# Patient Record
Sex: FEMALE | Race: WHITE | NOT HISPANIC OR LATINO | Employment: FULL TIME | URBAN - METROPOLITAN AREA
[De-identification: names, ages, dates, MRNs, and addresses within clinical notes are randomized per-mention and may not be internally consistent; named-entity substitution may affect disease eponyms.]

---

## 2017-01-23 ENCOUNTER — GENERIC CONVERSION - ENCOUNTER (OUTPATIENT)
Dept: OTHER | Facility: OTHER | Age: 47
End: 2017-01-23

## 2017-03-20 ENCOUNTER — GENERIC CONVERSION - ENCOUNTER (OUTPATIENT)
Dept: OTHER | Facility: OTHER | Age: 47
End: 2017-03-20

## 2017-04-07 ENCOUNTER — ALLSCRIPTS OFFICE VISIT (OUTPATIENT)
Dept: OTHER | Facility: OTHER | Age: 47
End: 2017-04-07

## 2017-07-14 ENCOUNTER — HOSPITAL ENCOUNTER (OUTPATIENT)
Dept: RADIOLOGY | Facility: HOSPITAL | Age: 47
Discharge: HOME | End: 2017-07-14
Payer: COMMERCIAL

## 2017-07-14 DIAGNOSIS — Z12.31 ENCOUNTER FOR SCREENING MAMMOGRAM FOR MALIGNANT NEOPLASM OF BREAST: ICD-10-CM

## 2017-07-14 PROCEDURE — G0202 SCR MAMMO BI INCL CAD: HCPCS

## 2017-07-22 ENCOUNTER — APPOINTMENT (EMERGENCY)
Dept: RADIOLOGY | Facility: HOSPITAL | Age: 47
End: 2017-07-22
Payer: COMMERCIAL

## 2017-07-22 ENCOUNTER — HOSPITAL ENCOUNTER (EMERGENCY)
Facility: HOSPITAL | Age: 47
Discharge: HOME | End: 2017-07-22
Attending: EMERGENCY MEDICINE | Admitting: EMERGENCY MEDICINE
Payer: COMMERCIAL

## 2017-07-22 VITALS
TEMPERATURE: 98.5 F | BODY MASS INDEX: 21.59 KG/M2 | HEART RATE: 94 BPM | SYSTOLIC BLOOD PRESSURE: 117 MMHG | OXYGEN SATURATION: 95 % | WEIGHT: 137.57 LBS | HEIGHT: 67 IN | DIASTOLIC BLOOD PRESSURE: 70 MMHG | RESPIRATION RATE: 18 BRPM

## 2017-07-22 DIAGNOSIS — R50.9 FEVER: Primary | ICD-10-CM

## 2017-07-22 DIAGNOSIS — R05.9 COUGH: ICD-10-CM

## 2017-07-22 DIAGNOSIS — R11.0 NAUSEA: ICD-10-CM

## 2017-07-22 LAB
BACTERIA UR QL AUTO: ABNORMAL /HPF
BILIRUB UR QL STRIP: NEGATIVE
CLARITY UR: CLEAR
CLARITY, POC: CLEAR
COLOR UR: YELLOW
COLOR, POC: YELLOW
EXT BILIRUBIN, UA: NEGATIVE
EXT BLOOD URINE: ABNORMAL
EXT GLUCOSE, UA: NEGATIVE
EXT KETONES: ABNORMAL
EXT NITRITE, UA: NEGATIVE
EXT PH, UA: 6
EXT PROTEIN, UA: ABNORMAL
EXT SPECIFIC GRAVITY, UA: 1
EXT UROBILINOGEN: NEGATIVE
GLUCOSE UR STRIP-MCNC: NEGATIVE MG/DL
HCG UR QL: NEGATIVE
HGB UR QL STRIP.AUTO: ABNORMAL
KETONES UR STRIP-MCNC: ABNORMAL MG/DL
LEUKOCYTE ESTERASE UR QL STRIP: ABNORMAL
NITRITE UR QL STRIP: NEGATIVE
NON-SQ EPI CELLS URNS QL MICRO: ABNORMAL /HPF
PH UR STRIP.AUTO: 6 [PH] (ref 4.5–8)
PROT UR STRIP-MCNC: NEGATIVE MG/DL
RBC #/AREA URNS AUTO: ABNORMAL /HPF
SP GR UR STRIP.AUTO: <=1.005 (ref 1–1.03)
UROBILINOGEN UR QL STRIP.AUTO: 0.2 E.U./DL
WBC # BLD EST: ABNORMAL 10*3/UL
WBC #/AREA URNS AUTO: ABNORMAL /HPF

## 2017-07-22 PROCEDURE — 81002 URINALYSIS NONAUTO W/O SCOPE: CPT | Performed by: EMERGENCY MEDICINE

## 2017-07-22 PROCEDURE — 99284 EMERGENCY DEPT VISIT MOD MDM: CPT

## 2017-07-22 PROCEDURE — 71020 HB CHEST X-RAY 2VW FRONTAL&LATL: CPT

## 2017-07-22 PROCEDURE — 81001 URINALYSIS AUTO W/SCOPE: CPT | Performed by: EMERGENCY MEDICINE

## 2017-07-22 PROCEDURE — 81025 URINE PREGNANCY TEST: CPT | Performed by: EMERGENCY MEDICINE

## 2017-07-22 RX ORDER — ONDANSETRON 4 MG/1
8 TABLET, ORALLY DISINTEGRATING ORAL ONCE
Status: COMPLETED | OUTPATIENT
Start: 2017-07-22 | End: 2017-07-22

## 2017-07-22 RX ORDER — CEPHALEXIN 500 MG/1
500 CAPSULE ORAL EVERY 6 HOURS SCHEDULED
COMMUNITY
End: 2018-05-18 | Stop reason: HOSPADM

## 2017-07-22 RX ORDER — ONDANSETRON 4 MG/1
8 TABLET, ORALLY DISINTEGRATING ORAL EVERY 8 HOURS PRN
Qty: 10 TABLET | Refills: 0 | Status: SHIPPED | OUTPATIENT
Start: 2017-07-22 | End: 2018-05-18 | Stop reason: HOSPADM

## 2017-07-22 RX ORDER — NAPROXEN 250 MG/1
500 TABLET ORAL ONCE
Status: COMPLETED | OUTPATIENT
Start: 2017-07-22 | End: 2017-07-22

## 2017-07-22 RX ADMIN — ONDANSETRON 8 MG: 4 TABLET, ORALLY DISINTEGRATING ORAL at 15:42

## 2017-07-22 RX ADMIN — NAPROXEN 500 MG: 250 TABLET ORAL at 15:42

## 2018-01-12 VITALS
RESPIRATION RATE: 16 BRPM | WEIGHT: 144.38 LBS | DIASTOLIC BLOOD PRESSURE: 76 MMHG | HEIGHT: 67 IN | HEART RATE: 68 BPM | SYSTOLIC BLOOD PRESSURE: 114 MMHG | BODY MASS INDEX: 22.66 KG/M2

## 2018-01-12 NOTE — PROGRESS NOTES
Assessment    1  Symptomatic varicose veins of left lower extremity (454 8) (Y32 020)    Plan     1  Gradient compression stocking, below knee, 20-30mm Hg, each; Status:Complete;     Done: 51BFA9676   2  Apply moisturizing cream or lotion several times a day ; Status:Complete;   Done:   77NXJ9812   3  Keep your leg elevated whenever you can to decrease swelling and increase circulation ;   Status:Complete;   Done: 83HXJ0265   4  Support stockings can help keep the blood from pooling in the small veins in your feet   and legs ; Status:Complete;   Done: 21OGR9245    Follow-up visit in 3 months Evaluation and Treatment  Follow-up  Status: Hold For - Scheduling  Requested for: 14Pup6745  Ordered; For: Symptomatic varicose veins of left lower extremity;  Ordered By: Dale Bonilla  Performed:   Due: 21WWV1723     Discussion/Summary  Discussion Summary:   Left medial calf varicosity with mild discomfort  Recommended conservative measures which includes compression (Rx given), periodic leg elevations, exercise and maintaining a normal weight  Stockings to be worn daily and removed at night  She will follow up with me in 3 months to check her symptoms with the use of compression  Chief Complaint  Chief Complaint Free Text Note Form: Patient is new to our practice and was referred by Dr Crystal Noyola (PCP)  She has not had any testing done  She complains of a bulging painful vein in her left leg by her knee for the past 10 years  She does not wear compression stockings or elevate her legs  History of Present Illness  Varicose Veins [facility]: The patient is being seen for an initial evaluation of an existing diagnosis of varicose veins  Referred by: Dr Kayy Cohen  Symptoms:  left dilated veins, bilateral leg swelling, bilateral muscle cramps, left spider veins and left leg pain, but no stasis dermatitis, no cellulitis, no hyperpigmentation, no venous ulcers, no dry skin, no itching and no bleeding     The patient describes the pain as aching and dull  These symptoms developed 10 year(s) ago  This patient has no history of DVT, pulmonary embolism, superficial venous thrombosis, or a hypercoagulable state  Evaluation and Treatment History: She was previously evaluated by a primary physician  This patient has had no prior surgical treatment of the venous system  The patient has never used compression hose  Free Text HPI: 53-year-old female comes in for initial evaluation of her veins  She has a bulging vein of the left medial calf is present for more than 10 years and though she should seek treatment  She has some aching discomfort to the area and tenderness when touched  She's not limited her daily activities  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  She reports ankle swelling at the end of the day  She is not currently wearing compression  She denies any history of deep or superficial venous thrombosis  No stasis changes  No history of ulcers or bleeding veins  Review of Systems  Complete Female - Vasc:   Constitutional: No fever or chills, feels well, no tiredness, no recent weight gain or weight loss  Eyes: No sudden vision loss, no blurred vision, no double vision  ENT: no loss of hearing, no nosebleeds, no hoarseness  Cardiovascular: no leg pain with walking and no bleeding veins, but regular heart rate, no chest pain, no intermittent leg claudication, painful veins and no palpitations  Respiratory: No sob, no wheezing, no cough, no sob with exertion, no orthopnea  Gastrointestinal: No nausea, No vomiting, no diarrhea, no blood in stool  Genitourinary: no dysuria, no Hematuria,no urinary incontinence  Musculoskeletal: no limb pain, no limb swelling  Integumentary: dry skin and no ulcers, but no rashes, no itching, no skin lesions and no skin wound  Neurological: no dementia, no headache, no numbness, no limb weakness, no dizziness, no difficulty walking  Psychiatric: anxiety and no mood disorder, but no depression  Hematologic/Lymphatic: no bleeding disorder, no easy bruising  ROS Reviewed:   ROS reviewed  Active Problems    1  Bloating (787 3) (R14 0)   2  Dysmenorrhea (625 3) (N94 6)   3  Encounter for gynecological examination without abnormal finding (V72 31) (Z01 419)   4  Encounter for routine gynecological examination (V72 31) (Z01 419)   5  Encounter for screening mammogram for malignant neoplasm of breast (V76 12)   (Z12 31)   6  Encounter for surveillance of contraceptive pills (V25 41) (Z30 41)    Past Medical History    1  History of Atypical squamous cells cannot exclude high grade squamous intraepithelial   lesion on cytologic smear of cervix (ASC-H) (795 02) (R87 611)   2  History of BETTY III (cervical intraepithelial neoplasia grade III) with severe dysplasia   (233 1) (D06 9)   3  History of Colposcopy Cervix With Biopsy(S)   4  History of human papillomavirus infection (V12 09) (Z86 19)   5  History of uterine leiomyoma (V13 29) (Z86 018)   6  History of Mild cervical dysplasia (622 11) (N87 0)   7  History of Pap Smear (+) Low Grade Squamous Intraepithelial Lesion (795 03)   8  History of Previous Spontaneous Vaginal Delivery  Active Problems And Past Medical History Reviewed: The active problems and past medical history were reviewed and updated today  Surgical History  Surgical History Reviewed: The surgical history was reviewed and updated today  Family History  Mother    1  No pertinent family history  Father    2  No pertinent family history  Family History    3  Family history of Arthritis (V17 7)   4  Family history of Diabetes Mellitus (V18 0)   5  Family history of Epilepsy   6  Family history of Urinary Incontinence   7  Family history of Varicose Veins Of Lower Extremities  Family History Reviewed: The family history was reviewed and updated today         Social History    · Being A Social Drinker   · Birth Control Method - Oral Contraceptives   · Caffeine Use   · Never A Smoker  Social History Reviewed: The social history was reviewed and updated today  Current Meds   1  Beyaz 3-0 02-0 451 MG Oral Tablet; Take 1 tablet daily; Therapy: 99NBC9123 to (Evaluate:09Phm3982)  Requested for: 21Nov2016; Last   Rx:21Nov2016 Ordered   2  Cyclafem 7/7/7 0 5/0 75/1-35 MG-MCG Oral Tablet; Take 1 tablet daily as directed; Therapy: 04NEV9589 to (Last Rx:12Oct2015)  Requested for: 19Oct2016; Status: ACTIVE   - Renewal Denied Ordered   3  Xanax 0 25 MG Oral Tablet; Therapy: (Recorded:30Sep2013) to Recorded  Medication List Reviewed: The medication list was reviewed and updated today  Allergies    1  Sulfa Drugs    Vitals  Vital Signs    Recorded: 84Pnc5431 04:59PM   Heart Rate 64, L Radial   Pulse Quality Normal, L Radial   Respiration Quality Normal   Respiration 14   Systolic 027, LUE, Sitting   Diastolic 70, LUE, Sitting   Height 5 ft 7 in   Weight 143 lb    BMI Calculated 22 4   BSA Calculated 1 75     Physical Exam    Posterior tibialis: right 2+ and left 2+  Dorsalis pedis: right 2+ and left 2+  Distal Pulse Exam: Normal Capillary Refill  Extremities: bilateral pretibial trace+ pitting edema  LE Varicose Veins: no right leg truncal veins, left leg reticular veins, no right leg spider veins and left leg spider veins  The heart rate was normal  The rhythm was regular  Heart sounds: normal S1 and normal S2    Murmurs: No murmurs were heard  Pulmonary   Respiratory effort: No increased work of breathing or signs of respiratory distress  Auscultation of lungs: Clear to auscultation  No wheezing, no rales, no rhonchi  Abdomen   Abdomen: Abdomen soft, non-tender, no masses, non distended, no rebound tenderness  Psychiatric   Orientation to person, place and time: Normal    Mood and affect: Normal    Neurologic Sensory exam normal   Motor skills intact     Musculoskeletal   Gait and station: Normal  Skin   Skin and subcutaneous tissue: Normal without rashes or lesions  Palpation of skin and subcutaneous tissue: Normal turgor  Venous Disease: No lipodermatosclerosis, stasis dermatitis, hyperpigmentation, or atrophie roshan noted on exam       Future Appointments    Date/Time Provider Specialty Site   03/13/2017 04:00 PM YOSHI Noland Vascular Surgery [facility]   10/27/2017 01:00 PM Salvador Calero MD Obstetrics/Gynecology [facility]     Signatures   Electronically signed by : Mya Farnsworth; Dec 21 2016  5:35PM EST                       (Author)    Electronically signed by :  Carri Chavez MD; Dec 27 2016  1:50PM EST                       (Author)

## 2018-01-12 NOTE — MISCELLANEOUS
Message   Recorded as Task   Date: 01/23/2017 08:06 AM, Created By: Dann Feliciano   Task Name: Med Renewal Request   Assigned To: Noemi Kendall   Regarding Patient: Adrianna Mcdaniel, Status: In Progress   Comment:    Lizette Stein - 23 Jan 2017 8:06 AM     TASK CREATED  JUST GOT A 90 DAY SUPPLY OF BEYAZ DOESNT LIKE THIS ONE AND WANTS TO GO BACK TO THE OLD PILL AND SHE WILL PAY OUT OF POCKET FOR IT CYCLOFEM  SHE WANTS THIS TO GO TO [facility] IN [place]  WANTS 90 DAY SUPPLY  Fay Grossman - 23 Jan 2017 9:11 AM     TASK EDITED  ok to send in old rx to pharm? Scot Cipro - 23 Jan 2017 9:19 AM     TASK IN PROGRESS   Scot Cipro - 23 Jan 2017 9:23 AM     TASK EDITED  Pt wants to go back on cyclafem  Said she thought it made her bloated  - Elenor Skyler gives her cramps  and is hot all the time  She is willing to pay for 3 mos of cyclafem at [facility] and then we are to change mail in to cyclafem for 3 mo  hence!!! Ok by you? ? Alannah Coca - 23 Jan 2017 12:48 PM     TASK REPLIED TO: Previously Assigned To Alannah Coclui                      yes please send in   Scot Cipro - 23 Jan 2017 1:13 PM     TASK EDITED  [facility]  She is to call us back in 2 mos for us to change rx for [facility]) - 3 mos of cyclafem  She will call us! ! Active Problems    1  Bloating (787 3) (R14 0)   2  Dysmenorrhea (625 3) (N94 6)   3  Encounter for gynecological examination without abnormal finding (V72 31) (Z01 419)   4  Encounter for routine gynecological examination (V72 31) (Z01 419)   5  Encounter for screening mammogram for malignant neoplasm of breast (V76 12)   (Z12 31)   6  Encounter for surveillance of contraceptive pills (V25 41) (Z30 41)   7  Symptomatic varicose veins of left lower extremity (454 8) (K21 684)    Current Meds   1  Beyaz 3-0 02-0 451 MG Oral Tablet (Drospiren-Eth Estrad-Levomefol); Take 1 tablet   daily;    Therapy: 76FKM6389 to (Evaluate:41Usw5116)  Requested for: 13EWD8864; Last   Rx:21Nov2016 Ordered   2  Cyclafem 7/7/7 0 5/0 75/1-35 MG-MCG Oral Tablet; Take 1 tablet daily as directed; Therapy: 97YBW2231 to (Last Rx:17Jan2017)  Requested for: 23SGK8389 Ordered   3  Xanax 0 25 MG Oral Tablet (ALPRAZolam); Therapy: (Recorded:30Sep2013) to Recorded    Allergies    1  Sulfa Drugs    Plan  Dysmenorrhea, Encounter for surveillance of contraceptive pills    · From  Cyclafem 7/7/7 0 5/0 75/1-35 MG-MCG Oral Tablet Take 1 tablet daily as  directed To Cyclafem 7/7/7 0 5/0 75/1-35 MG-MCG Oral Tablet TAKE 1 TABLET DAILY    Signatures   Electronically signed by :  Javan Braswell, ; Jan 23 2017  1:13PM EST                       (Author)

## 2018-01-12 NOTE — MISCELLANEOUS
Message   Recorded as Task   Date: 03/20/2017 11:43 AM, Created By: Henry Mg   Task Name: Med Renewal Request   Assigned To: Amanda Jimenez   Regarding Patient: Fuad Oconnell, Status: Active   Comment:    Jaja Gallardo - 20 Mar 2017 11:43 AM     TASK CREATED  Caller: Self; Renew Medication; [phone] (Home); [phone] (Work)  pt is calling in that she likes the current pill Cyclaremm 777-she is on- would like it sent to [facility]-3 month supply  yearly October 2017  she is @[phone]  Amanda Jimenez - 20 Mar 2017 11:54 AM     TASK EDITED  rx to pharm        Active Problems    1  Bloating (787 3) (R14 0)   2  Dysmenorrhea (625 3) (N94 6)   3  Encounter for gynecological examination without abnormal finding (V72 31) (Z01 419)   4  Encounter for routine gynecological examination (V72 31) (Z01 419)   5  Encounter for screening mammogram for malignant neoplasm of breast (V76 12)   (Z12 31)   6  Encounter for surveillance of contraceptive pills (V25 41) (Z30 41)   7  Symptomatic varicose veins of left lower extremity (454 8) (O60 740)    Current Meds   1  Beyaz 3-0 02-0 451 MG Oral Tablet (Drospiren-Eth Estrad-Levomefol); Take 1 tablet   daily; Therapy: 27HZB9559 to (Evaluate:80Yrj7041)  Requested for: 21Nov2016; Last   Rx:21Nov2016 Ordered   2  Cyclafem 7/7/7 0 5/0 75/1-35 MG-MCG Oral Tablet; TAKE 1 TABLET DAILY; Therapy: 54LWE2697 to (Evaluate:23Apr2017)  Requested for: 89OQH3705; Last   Rx:23Jan2017 Ordered   3  Xanax 0 25 MG Oral Tablet (ALPRAZolam); Therapy: (Recorded:95Lej0284) to Recorded    Allergies    1   Sulfa Drugs    Plan  Dysmenorrhea, Encounter for surveillance of contraceptive pills    · Cyclafem 7/7/7 0 5/0 75/1-35 MG-MCG Oral Tablet; TAKE 1 TABLET DAILY    Signatures   Electronically signed by : Greg Palma, ; Mar 20 2017 11:54AM EST                       (Author)

## 2018-02-11 DIAGNOSIS — Z30.41 ENCOUNTER FOR SURVEILLANCE OF CONTRACEPTIVE PILLS: Primary | ICD-10-CM

## 2018-02-12 RX ORDER — NORETHINDRONE AND ETHINYL ESTRADIOL 7 DAYS X 3
KIT ORAL
Qty: 84 TABLET | Refills: 3 | Status: SHIPPED | OUTPATIENT
Start: 2018-02-12 | End: 2018-05-18 | Stop reason: ALTCHOICE

## 2018-03-28 ENCOUNTER — TELEPHONE (OUTPATIENT)
Dept: OBGYN CLINIC | Facility: CLINIC | Age: 48
End: 2018-03-28

## 2018-03-28 DIAGNOSIS — B37.9 YEAST INFECTION: Primary | ICD-10-CM

## 2018-03-28 RX ORDER — FLUCONAZOLE 150 MG/1
TABLET ORAL
Qty: 2 TABLET | Refills: 0 | Status: SHIPPED | OUTPATIENT
Start: 2018-03-28 | End: 2018-03-31

## 2018-03-28 NOTE — TELEPHONE ENCOUNTER
Pt was on z brock 10 days ago and now on 5th day of amoxicillin - 2 more to go  Pt has vaginal swelling, iritation, wetness and some itching  Also has incontinence when coughing   Denies any sx of uti  Pt to try to stay dry, hair blower on affected area   Rx diflucan to epic

## 2018-05-18 ENCOUNTER — ANNUAL EXAM (OUTPATIENT)
Dept: OBGYN CLINIC | Age: 48
End: 2018-05-18
Payer: COMMERCIAL

## 2018-05-18 VITALS
BODY MASS INDEX: 21.35 KG/M2 | DIASTOLIC BLOOD PRESSURE: 64 MMHG | HEIGHT: 67 IN | WEIGHT: 136 LBS | SYSTOLIC BLOOD PRESSURE: 120 MMHG

## 2018-05-18 DIAGNOSIS — Z01.419 ENCOUNTER FOR ANNUAL ROUTINE GYNECOLOGICAL EXAMINATION: Primary | ICD-10-CM

## 2018-05-18 DIAGNOSIS — Z30.41 ENCOUNTER FOR SURVEILLANCE OF CONTRACEPTIVE PILLS: ICD-10-CM

## 2018-05-18 DIAGNOSIS — Z12.31 ENCOUNTER FOR SCREENING MAMMOGRAM FOR MALIGNANT NEOPLASM OF BREAST: ICD-10-CM

## 2018-05-18 PROCEDURE — G0145 SCR C/V CYTO,THINLAYER,RESCR: HCPCS | Performed by: NURSE PRACTITIONER

## 2018-05-18 PROCEDURE — 87624 HPV HI-RISK TYP POOLED RSLT: CPT | Performed by: NURSE PRACTITIONER

## 2018-05-18 PROCEDURE — S0612 ANNUAL GYNECOLOGICAL EXAMINA: HCPCS | Performed by: NURSE PRACTITIONER

## 2018-05-18 RX ORDER — CYCLOBENZAPRINE HCL 10 MG
TABLET ORAL
COMMUNITY
Start: 2017-05-11

## 2018-05-18 RX ORDER — AZELASTINE HYDROCHLORIDE 0.5 MG/ML
SOLUTION/ DROPS OPHTHALMIC
COMMUNITY
Start: 2018-05-12

## 2018-05-18 RX ORDER — ALPRAZOLAM 0.25 MG/1
0.25 TABLET ORAL 3 TIMES DAILY
COMMUNITY

## 2018-05-18 NOTE — PROGRESS NOTES
Assessment/Plan:    No problem-specific Assessment & Plan notes found for this encounter  Diagnoses and all orders for this visit:    Encounter for annual routine gynecological examination  -     Liquid-based pap, screening    Encounter for surveillance of contraceptive pills  -     norethindrone-ethinyl estradiol-iron (ESTROSTEP FE) 1-20/1-30/1-35 MG-MCG TABS; Take 1 tablet by mouth daily    Encounter for screening mammogram for malignant neoplasm of breast  -     Mammo screening bilateral w 3d & cad; Future    Other orders  -     ALPRAZolam (XANAX) 0 25 mg tablet; Take 0 25 mg by mouth Three times a day  -     azelastine (OPTIVAR) 0 05 % ophthalmic solution;   -     cyclobenzaprine (FLEXERIL) 10 mg tablet; TAKE 1 TABLET EVERY DAY        Call as needed, encouraged calcium/vit D in her diet, all questions answered  Patient agrees to decrease her ocp dose in 2 mos after her vacation  Subjective:      Patient ID: Jasper Smith is a 46 y o  female  Pleasant 46 y o  premenopausal female here for annual exam  She denies any issues with menstrual/vaginal bleeding but her menses are lighter and she is having hot flashes  She is interested in decreasing her ocp dose  + history of abnormal pap smears yrs ago, reports last Pap as NIL but not in chart?, + pap today  Denies vaginal issues  Denies pelvic pain  Denies any issues with her current BCM  Colonoscopy q 3 yrs for IBS          The following portions of the patient's history were reviewed and updated as appropriate:   She  has a past medical history of Atypical squamous cells cannot exclude high grade squamous intraepithelial lesion on cytologic smear of cervix (ASC-H); BETTY III (cervical intraepithelial neoplasia grade III) with severe dysplasia; HPV (human papilloma virus) infection; and Uterine leiomyoma    She   Patient Active Problem List    Diagnosis Date Noted    Encounter for annual routine gynecological examination 05/18/2018    Encounter for surveillance of contraceptive pills 2018    Bloating 10/12/2015    Dysmenorrhea 2012     She  has a past surgical history that includes Bunionectomy; Cervical biopsy w/ loop electrode excision; Colposcopy (2003); Tonsillectomy and adenoidectomy; and Colposcopy vulva w/ biopsy (11/10/2008)  Her family history includes Arthritis in her family; Diabetes in her family; No Known Problems in her father and mother; Other in her family; Varicose Veins in her family  She  reports that she has never smoked  She has never used smokeless tobacco  She reports that she drinks alcohol  She reports that she does not use drugs  Current Outpatient Prescriptions   Medication Sig Dispense Refill    ALPRAZolam (XANAX) 0 25 mg tablet Take 0 25 mg by mouth Three times a day      azelastine (OPTIVAR) 0 05 % ophthalmic solution       cyclobenzaprine (FLEXERIL) 10 mg tablet TAKE 1 TABLET EVERY DAY      norethindrone-ethinyl estradiol-iron (ESTROSTEP FE) 1-20/1-30/1-35 MG-MCG TABS Take 1 tablet by mouth daily 84 tablet 4     No current facility-administered medications for this visit  She is allergic to sulfa antibiotics  OB History    Para Term  AB Living   1 1 1     1   SAB TAB Ectopic Multiple Live Births           1      # Outcome Date GA Lbr Amado/2nd Weight Sex Delivery Anes PTL Lv   1 Term               Obstetric Comments           Review of Systems      Objective:      /64 (BP Location: Right arm, Patient Position: Sitting)   Ht 5' 7" (1 702 m)   Wt 61 7 kg (136 lb)   LMP 2018   Breastfeeding? No   BMI 21 30 kg/m²          Physical Exam    Review of Systems   Constitutional: Negative for chills, fatigue, fever and unexpected weight change  Respiratory: Negative for shortness of breath  Gastrointestinal: Negative for anal bleeding, blood in stool, constipation and diarrhea  Genitourinary: Negative for difficulty urinating, dysuria and hematuria       Physical Exam   Constitutional: She appears well-developed and well-nourished  No distress  HENT:   Head: Normocephalic  Neck: Normal range of motion  Neck supple  Pulmonary/Chest: Effort normal  Right breast exhibits no inverted nipple, no mass, no nipple discharge, no skin change and no tenderness  Left breast exhibits no inverted nipple, no mass, no nipple discharge, no skin change and no tenderness  Breasts are symmetrical    Abdominal: Soft  Genitourinary: No breast swelling, tenderness, discharge or bleeding  Pelvic exam was performed with patient supine  No labial fusion  There is no rash, tenderness, lesion or injury on the right labia  There is no rash, tenderness, lesion or injury on the left labia  Uterus is not deviated, not enlarged, not fixed and not tender  Cervix exhibits no motion tenderness, no discharge  +CONTACT BLED, POSS SMALL POLYPOID LESION AT OS Right adnexum displays no mass, no tenderness and no fullness  Left adnexum displays no mass, no tenderness and no fullness  No erythema or tenderness in the vagina  No foreign body in the vagina  No signs of injury around the vagina  No vaginal discharge found  Lymphadenopathy:        Right: No inguinal adenopathy present  Left: No inguinal adenopathy present

## 2018-05-18 NOTE — PATIENT INSTRUCTIONS
Birth Control Pills   WHAT YOU NEED TO KNOW:   What are birth control pills? Birth control pills are also called oral contraceptives, or the pill  It is medicine that helps prevent pregnancy  Birth control pills work by preventing ovulation  Ovulation is when the ovaries make and release an egg cell each month  If this egg gets fertilized by sperm, pregnancy occurs  Birth control pills may also help to prevent pregnancy by keeping sperm from fertilizing an egg  What may be done before I can start taking birth control pills? You need to see your healthcare provider to get a prescription  Any of the following may be done before your healthcare provider gives you a prescription:  · Your healthcare provider will ask you about diseases and illnesses you have had in the past  He will check your risk for blood clots, heart conditions, or stroke  He will also check your blood pressure, and may do a breast and pelvic exam  A Pap smear may also be done during the pelvic exam  This is a test to make sure you do not have abnormal changes on your cervix  You may need other tests, such as a urine test, to make sure you are not pregnant  · Your healthcare provider will ask if you take any medicines and if you smoke  Smoking increases your risk for stroke, heart attack, or a blood clot in your lungs  If you smoke, you should not take certain kinds of birth control pills  What are the advantages of birth control pills? When birth control pills are used correctly, the chances of getting pregnant are very low  Birth control pills may help decrease bleeding and pain during your monthly period  They may also help prevent cancer of the uterus and ovaries  What are the disadvantages of birth control pills? You may have sudden changes in your mood or feelings while you take birth control pills  You may have nausea and decreased sex drive  You may have an increased appetite and rapid weight gain   You may also have bleeding in between periods, less frequent periods, vaginal dryness, and breast pain  Birth control pills will not protect you from sexually transmitted infections  Rarely, some birth control pills can increase your risk for a blood clot  This may become life-threatening  What should I do if I decide I want to get pregnant? If you are planning to have a baby, ask your healthcare provider when you may stop taking your birth control pills  It may take some time for you to start ovulating again  Ask your healthcare provider for more information about pregnancy after birth control pills  When should I start taking birth control pills after I have a baby? If you are not breastfeeding, you may start taking birth control pills 3 weeks after you give birth  You may be able to take certain types of birth control pills if you are breastfeeding  These pills can be started from 6 weeks to 6 months after you give birth  Ask your healthcare provider for more information about when to start taking birth control pills after you give birth  When should I contact my healthcare provider? · You have forgotten to take a birth control pill  · You have mood changes, such as depression, since starting birth control pills  · You have nausea or you are vomiting  · You have severe abdominal pain  · You missed a period and have questions or concerns about being pregnant  · You still have bleeding 4 months after taking birth control pills correctly  · You have questions or concerns about your condition or care  When should I seek immediate care or call 911? · Your arm or leg feels warm, tender, and painful  It may look swollen and red  · You feel lightheaded, short of breath, and have chest pain  · You cough up blood      · You have any of the following signs of a stroke:      ¨ Numbness or drooping on one side of your face     ¨ Weakness in an arm or leg    ¨ Confusion or difficulty speaking    ¨ Dizziness, a severe headache, or vision loss    · You have severe pain, numbness, or swelling in your arms or legs  CARE AGREEMENT:   You have the right to help plan your care  Learn about your health condition and how it may be treated  Discuss treatment options with your caregivers to decide what care you want to receive  You always have the right to refuse treatment  The above information is an  only  It is not intended as medical advice for individual conditions or treatments  Talk to your doctor, nurse or pharmacist before following any medical regimen to see if it is safe and effective for you  © 2017 [facility] Information is for End User's use only and may not be sold, redistributed or otherwise used for commercial purposes  All illustrations and images included in CareNotes® are the copyrighted property of A D A M , Inc  or [facility]

## 2018-05-21 DIAGNOSIS — B37.9 CANDIDIASIS: Primary | ICD-10-CM

## 2018-05-21 NOTE — TELEPHONE ENCOUNTER
Gabriela, I spoke with patient  She said she saw you on Friday and you told her you were calling in a prescription for her for a cream because she still has some irritation  She does not know the name of the cream you said you were going to prescribe

## 2018-05-22 LAB — HPV RRNA GENITAL QL NAA+PROBE: NORMAL

## 2018-05-23 LAB
LAB AP GYN PRIMARY INTERPRETATION: NORMAL
Lab: NORMAL

## 2018-07-02 ENCOUNTER — TELEPHONE (OUTPATIENT)
Dept: OBGYN CLINIC | Facility: CLINIC | Age: 48
End: 2018-07-02

## 2018-07-02 DIAGNOSIS — Z30.011 ENCOUNTER FOR INITIAL PRESCRIPTION OF CONTRACEPTIVE PILLS: ICD-10-CM

## 2018-07-02 DIAGNOSIS — Z30.011 ENCOUNTER FOR INITIAL PRESCRIPTION OF CONTRACEPTIVE PILLS: Primary | ICD-10-CM

## 2018-07-02 NOTE — TELEPHONE ENCOUNTER
Pt was seen about a month ago and was told to call now to get a new lower dose of estrogen BC pill sent to [facility]

## 2018-07-02 NOTE — TELEPHONE ENCOUNTER
Patient is taking Estrostep Fe 1-20/1-30/1-35mg  States she wants a lower dose of estrogen  Please advise  Thanks!

## 2018-07-02 NOTE — TELEPHONE ENCOUNTER
I am not sure if the estrostep is the new birth control pill or were you sending in something new, please advise

## 2018-08-17 ENCOUNTER — HOSPITAL ENCOUNTER (OUTPATIENT)
Dept: RADIOLOGY | Facility: HOSPITAL | Age: 48
Discharge: HOME | End: 2018-08-17
Payer: COMMERCIAL

## 2018-08-17 DIAGNOSIS — Z12.31 ENCOUNTER FOR SCREENING MAMMOGRAM FOR MALIGNANT NEOPLASM OF BREAST: ICD-10-CM

## 2018-08-17 PROCEDURE — 77067 SCR MAMMO BI INCL CAD: CPT

## 2018-08-17 PROCEDURE — 77063 BREAST TOMOSYNTHESIS BI: CPT

## 2018-10-01 ENCOUNTER — TELEPHONE (OUTPATIENT)
Dept: OBGYN CLINIC | Age: 48
End: 2018-10-01

## 2018-10-01 NOTE — TELEPHONE ENCOUNTER
Pt on antibiotics and has a hx of yeast infections  Pt would like us to call in an RX for diflucan  Please advise

## 2018-10-01 NOTE — TELEPHONE ENCOUNTER
Pt does not have any real sx yet but feels a yeast inf may be coming on, told pt to try monistat 3 day first then cb if it doesn't work

## 2018-10-05 ENCOUNTER — TELEPHONE (OUTPATIENT)
Dept: OBGYN CLINIC | Facility: CLINIC | Age: 48
End: 2018-10-05

## 2018-10-05 NOTE — TELEPHONE ENCOUNTER
Patient called and said she needs a refill sent to her mail order pharmacy of the medication VELIVET

## 2018-10-22 DIAGNOSIS — N89.8 VAGINAL IRRITATION: Primary | ICD-10-CM

## 2018-10-22 RX ORDER — FLUCONAZOLE 150 MG/1
TABLET ORAL
Qty: 2 TABLET | Refills: 0 | Status: SHIPPED | OUTPATIENT
Start: 2018-10-22 | End: 2018-10-25

## 2018-10-22 NOTE — TELEPHONE ENCOUNTER
Spoke with pt - symptoms started 2 weeks ago  She tried Monistat - it did not help  She still has itching, thick white cottage cheese discharge and burning when urine touches the skin or when wiping  Feels raw and irritated  Patient is requesting an RX of Diflucan  Rx in Epic - if ok please sign  Thanks!

## 2018-12-12 ENCOUNTER — TELEPHONE (OUTPATIENT)
Dept: OBGYN CLINIC | Age: 48
End: 2018-12-12

## 2018-12-12 DIAGNOSIS — Z30.011 ENCOUNTER FOR INITIAL PRESCRIPTION OF CONTRACEPTIVE PILLS: Primary | ICD-10-CM

## 2018-12-12 NOTE — TELEPHONE ENCOUNTER
Py needs a refill on her BC desogestrel-ethinyl estradiol (VELIVET) 0 1/0 125/0 15 -0 025 MG tablet  Please use [facility] mail order      Thank you for all you do!

## 2019-05-24 ENCOUNTER — OFFICE VISIT (OUTPATIENT)
Dept: OBGYN CLINIC | Age: 49
End: 2019-05-24
Payer: COMMERCIAL

## 2019-05-24 VITALS
BODY MASS INDEX: 21.44 KG/M2 | HEIGHT: 67 IN | WEIGHT: 136.6 LBS | DIASTOLIC BLOOD PRESSURE: 78 MMHG | SYSTOLIC BLOOD PRESSURE: 108 MMHG

## 2019-05-24 DIAGNOSIS — Z01.411 ENCOUNTER FOR GYNECOLOGICAL EXAMINATION WITH ABNORMAL FINDING: Primary | ICD-10-CM

## 2019-05-24 DIAGNOSIS — Z30.41 SURVEILLANCE OF CONTRACEPTIVE PILL: ICD-10-CM

## 2019-05-24 DIAGNOSIS — Z12.31 ENCOUNTER FOR SCREENING MAMMOGRAM FOR MALIGNANT NEOPLASM OF BREAST: ICD-10-CM

## 2019-05-24 DIAGNOSIS — N64.4 BREAST PAIN, LEFT: ICD-10-CM

## 2019-05-24 PROBLEM — Z01.419 ENCOUNTER FOR GYNECOLOGICAL EXAMINATION WITHOUT ABNORMAL FINDING: Status: ACTIVE | Noted: 2019-05-24

## 2019-05-24 PROBLEM — Z01.419 ENCOUNTER FOR ANNUAL ROUTINE GYNECOLOGICAL EXAMINATION: Status: RESOLVED | Noted: 2018-05-18 | Resolved: 2019-05-24

## 2019-05-24 PROCEDURE — S0612 ANNUAL GYNECOLOGICAL EXAMINA: HCPCS | Performed by: NURSE PRACTITIONER

## 2019-05-24 RX ORDER — CYANOCOBALAMIN 1000 UG/ML
1000 INJECTION INTRAMUSCULAR; SUBCUTANEOUS WEEKLY
Refills: 3 | COMMUNITY
Start: 2019-04-29

## 2019-05-24 RX ORDER — LEVONORGESTREL AND ETHINYL ESTRADIOL AND ETHINYL ESTRADIOL 0.15MG(84)
1 KIT ORAL DAILY
Qty: 91 TABLET | Refills: 3 | Status: SHIPPED | OUTPATIENT
Start: 2019-05-24 | End: 2019-06-18 | Stop reason: ALTCHOICE

## 2019-05-24 RX ORDER — LEVONORGESTREL AND ETHINYL ESTRADIOL AND ETHINYL ESTRADIOL 0.15MG(84)
1 KIT ORAL DAILY
Qty: 91 TABLET | Refills: 0 | Status: SHIPPED | OUTPATIENT
Start: 2019-05-24 | End: 2019-05-24 | Stop reason: SDUPTHER

## 2019-06-03 ENCOUNTER — TELEPHONE (OUTPATIENT)
Dept: OBGYN CLINIC | Facility: CLINIC | Age: 49
End: 2019-06-03

## 2019-06-18 DIAGNOSIS — IMO0001 CONTRACEPTION: Primary | ICD-10-CM

## 2019-06-18 NOTE — TELEPHONE ENCOUNTER
Patient called and requested refill script for [facility] sent to [facility] mail order

## 2019-06-18 NOTE — TELEPHONE ENCOUNTER
Pt called req to go back on velvleit as she does not want the new pill rx'd       Her mail order has changed to [facility]  rx placed in [place] and pharm changed

## 2019-10-11 ENCOUNTER — HOSPITAL ENCOUNTER (OUTPATIENT)
Dept: RADIOLOGY | Facility: HOSPITAL | Age: 49
Discharge: HOME | End: 2019-10-11
Payer: COMMERCIAL

## 2019-10-11 VITALS — HEIGHT: 67 IN | BODY MASS INDEX: 21.35 KG/M2 | WEIGHT: 136 LBS

## 2019-10-11 DIAGNOSIS — Z12.31 ENCOUNTER FOR SCREENING MAMMOGRAM FOR MALIGNANT NEOPLASM OF BREAST: ICD-10-CM

## 2019-10-11 PROCEDURE — 77067 SCR MAMMO BI INCL CAD: CPT

## 2019-10-11 PROCEDURE — 77063 BREAST TOMOSYNTHESIS BI: CPT

## 2020-06-02 ENCOUNTER — ANNUAL EXAM (OUTPATIENT)
Dept: OBGYN CLINIC | Age: 50
End: 2020-06-02
Payer: COMMERCIAL

## 2020-06-02 VITALS
SYSTOLIC BLOOD PRESSURE: 122 MMHG | DIASTOLIC BLOOD PRESSURE: 74 MMHG | BODY MASS INDEX: 21.66 KG/M2 | WEIGHT: 138 LBS | HEIGHT: 67 IN

## 2020-06-02 DIAGNOSIS — Z01.419 ENCOUNTER FOR GYNECOLOGICAL EXAMINATION WITHOUT ABNORMAL FINDING: Primary | ICD-10-CM

## 2020-06-02 DIAGNOSIS — Z12.31 ENCOUNTER FOR SCREENING MAMMOGRAM FOR MALIGNANT NEOPLASM OF BREAST: ICD-10-CM

## 2020-06-02 DIAGNOSIS — Z30.41 SURVEILLANCE OF CONTRACEPTIVE PILL: ICD-10-CM

## 2020-06-02 PROBLEM — B37.9 CANDIDIASIS: Status: RESOLVED | Noted: 2018-05-21 | Resolved: 2020-06-02

## 2020-06-02 PROCEDURE — S0612 ANNUAL GYNECOLOGICAL EXAMINA: HCPCS | Performed by: NURSE PRACTITIONER

## 2020-12-18 ENCOUNTER — HOSPITAL ENCOUNTER (OUTPATIENT)
Dept: MAMMOGRAPHY | Facility: CLINIC | Age: 50
Discharge: HOME | End: 2020-12-18
Payer: COMMERCIAL

## 2020-12-18 VITALS — WEIGHT: 140 LBS | BODY MASS INDEX: 21.97 KG/M2 | HEIGHT: 67 IN

## 2020-12-18 DIAGNOSIS — Z12.31 ENCOUNTER FOR SCREENING MAMMOGRAM FOR MALIGNANT NEOPLASM OF BREAST: ICD-10-CM

## 2020-12-18 PROCEDURE — 77063 BREAST TOMOSYNTHESIS BI: CPT

## 2020-12-18 PROCEDURE — 77067 SCR MAMMO BI INCL CAD: CPT

## 2021-06-17 ENCOUNTER — TELEPHONE (OUTPATIENT)
Dept: GASTROENTEROLOGY | Facility: CLINIC | Age: 51
End: 2021-06-17

## 2021-06-17 ENCOUNTER — ANNUAL EXAM (OUTPATIENT)
Dept: OBGYN CLINIC | Facility: CLINIC | Age: 51
End: 2021-06-17
Payer: COMMERCIAL

## 2021-06-17 VITALS
SYSTOLIC BLOOD PRESSURE: 124 MMHG | DIASTOLIC BLOOD PRESSURE: 88 MMHG | WEIGHT: 135 LBS | BODY MASS INDEX: 21.19 KG/M2 | HEIGHT: 67 IN

## 2021-06-17 DIAGNOSIS — Z01.411 ENCOUNTER FOR GYNECOLOGICAL EXAMINATION WITH ABNORMAL FINDING: Primary | ICD-10-CM

## 2021-06-17 DIAGNOSIS — N94.6 DYSMENORRHEA: ICD-10-CM

## 2021-06-17 DIAGNOSIS — Z30.41 ENCOUNTER FOR SURVEILLANCE OF CONTRACEPTIVE PILLS: ICD-10-CM

## 2021-06-17 DIAGNOSIS — Z12.31 SCREENING MAMMOGRAM, ENCOUNTER FOR: ICD-10-CM

## 2021-06-17 DIAGNOSIS — Z76.89 ENCOUNTER TO ESTABLISH CARE: ICD-10-CM

## 2021-06-17 DIAGNOSIS — N85.2 ENLARGED UTERUS: ICD-10-CM

## 2021-06-17 PROCEDURE — S0612 ANNUAL GYNECOLOGICAL EXAMINA: HCPCS | Performed by: NURSE PRACTITIONER

## 2021-06-17 NOTE — TELEPHONE ENCOUNTER
Received order from nurse practitioner w/OB to call and schedule an appointment with Dr Brina Lord and spoke with patient, doesn't know why referred, having no issues and not due for colonoscopy until 11/2023  Faxed last colon report/bx to [facility]

## 2021-06-17 NOTE — PROGRESS NOTES
Diagnoses and all orders for this visit:    Encounter for gynecological examination with abnormal finding    Dysmenorrhea    Enlarged uterus  -     US pelvis complete w transvaginal; Future    Encounter for surveillance of contraceptive pills  -     desogestrel-ethinyl estradiol (VELIVET) 0 1/0 125/0 15 -0 025 MG tablet; Take 1 tablet by mouth daily    Screening mammogram, encounter for  -     Mammo screening bilateral w 3d & cad; Future    Encounter to establish care  -     Ambulatory referral to Gastroenterology; Future      START A MENSES CALENDAR  Calcium/vit d inclusion in the diet discussed, call with any issues, SBE reinforced, all concerns addressed  Pleasant 46 y o  premenopausal female here for annual exam  She denies any issues with bleeding or her menses  Reports regular cycles on ocp  Still having occasional hot flashes  Menarche 10  +history of abnormal pap smears and LEEP  Last Pap 2018 neg and HPV neg, no pap today  Denies vaginal issues  Denies pelvic pain  Denies any issues with her BCM  Sexually active without any concerns  Colonoscopy q 5 yrs for IBS    Past Medical History:   Diagnosis Date    Atypical squamous cells cannot exclude high grade squamous intraepithelial lesion on cytologic smear of cervix (ASC-H)     6/1/2009 PAP ASC-H / HPV +   10/2/2008 PAP GUANAKITO- CANNOT RULE OUT A NEOPLASTIC PROCESS / HPV +    LETICIA III (cervical intraepithelial neoplasia grade III) with severe dysplasia     resolved: 7/1/2008  LEEP- 7/1/2009 LETICIA II-III    Fatigue     HPV (human papilloma virus) infection     6/1/2009 PAP ASC-H /HPV +  PAP 11/10/2008 LETICIA I /LGSIL / HPV  10/2/2008 PAP GUANAKITO- CANNOT RULE OUT A NEOPLASTIC PROCESS/ HPV +  11/10/2008 COLPO LETICIA I / LGSIL / HPV POSITIVE      Uterine leiomyoma      Past Surgical History:   Procedure Laterality Date    BUNIONECTOMY      CERVICAL BIOPSY  W/ LOOP ELECTRODE EXCISION      leep - leticia III   Resolved: 7/2009    COLPOSCOPY  12/03/2003    pap smear abnormality of cervix with ASCUS favoring benign       COLPOSCOPY VULVA W/ BIOPSY  11/10/2008    colpo leticia I/ LGSIL/ HPV positive    TONSILLECTOMY AND ADENOIDECTOMY       Family History   Problem Relation Age of Onset    No Known Problems Mother     No Known Problems Father     Arthritis Family     Diabetes Family     Other Family         seizure, urinary incontinence    Varicose Veins Family     No Known Problems Maternal Grandmother     No Known Problems Maternal Aunt     No Known Problems Maternal Aunt     No Known Problems Maternal Grandfather     No Known Problems Paternal Grandmother     No Known Problems Paternal Grandfather     Colon cancer Neg Hx     Ovarian cancer Neg Hx     Uterine cancer Neg Hx     Cervical cancer Neg Hx      Social History     Tobacco Use    Smoking status: Never Smoker    Smokeless tobacco: Never Used   Vaping Use    Vaping Use: Never used   Substance Use Topics    Alcohol use: Yes     Comment: social    Drug use: No       Current Outpatient Medications:     ALPRAZolam (XANAX) 0 25 mg tablet, Take 0 25 mg by mouth Three times a day, Disp: , Rfl:     cyanocobalamin 1,000 mcg/mL, Inject 1,000 mcg under the skin once a week, Disp: , Rfl: 3    cyclobenzaprine (FLEXERIL) 10 mg tablet, TAKE 1 TABLET EVERY DAY, Disp: , Rfl:     azelastine (OPTIVAR) 0 05 % ophthalmic solution, , Disp: , Rfl:     desogestrel-ethinyl estradiol (VELIVET) 0 1/0 125/0 15 -0 025 MG tablet, Take 1 tablet by mouth daily, Disp: 28 tablet, Rfl: 12  Patient Active Problem List    Diagnosis Date Noted    Enlarged uterus 2021    Encounter for gynecological examination without abnormal finding 2019    Encounter for surveillance of contraceptive pills 2018    Dysmenorrhea 2012       Allergies   Allergen Reactions    Sulfa Antibiotics        OB History    Para Term  AB Living   1 1 1     1   SAB TAB Ectopic Multiple Live Births           1      # Outcome Date GA Lbr Amado/2nd Weight Sex Delivery Anes PTL Lv   1 Term               Obstetric Comments         Works for [employer] in [address] [place] [address] but is remote currently    Vitals:    21 0704   BP: 124/88   BP Location: Left arm   Patient Position: Sitting   Weight: 61 2 kg (135 lb)   Height: 5' 7" (1 702 m)     Body mass index is 21 14 kg/m²  Review of Systems   Constitutional: Negative for chills, fatigue, fever and unexpected weight change  Respiratory: Negative for shortness of breath  Gastrointestinal: Negative for anal bleeding, blood in stool, constipation and diarrhea  Genitourinary: Negative for difficulty urinating, dysuria and hematuria  Physical Exam   Constitutional: She appears well-developed and well-nourished  No distress  HENT:   Head: Normocephalic  Neck: Normal range of motion  Neck supple  Pulmonary: Effort normal   Breasts: bilateral without masses, skin changes or nipple discharge  Bilaterally soft and warm to touch  No areas of erythema or pain  Abdominal: Soft  Pelvic exam was performed with patient supine  No labial fusion  There is no rash, tenderness, lesion or injury on the right labia  There is no rash, tenderness, lesion or injury on the left labia  Urethral meatus does not show any tenderness, inflammation or discharge  Palpation of midline bladder without pain or discomfort  Uterus is not deviated, SEEMS enlarged, not fixed and not tender  Cervix exhibits no motion tenderness, no discharge and no friability  LEEP cervix  Right adnexum displays no mass, no tenderness and no fullness  Left adnexum displays no mass, no tenderness and no fullness  No erythema or tenderness in the vagina  No foreign body in the vagina  No signs of injury around the vagina  No vaginal discharge found  No signs of injury around the vagina or anus  Perineum without lesions, signs of injury, erythema or swelling  Lymphadenopathy:        Right: No inguinal adenopathy present          Left: No inguinal adenopathy present

## 2021-06-17 NOTE — PATIENT INSTRUCTIONS
Birth Control Pills   WHAT YOU NEED TO KNOW:   What are birth control pills? Birth control pills are also called oral contraceptives, or the pill  It is medicine that helps prevent pregnancy by stopping ovulation  Ovulation is when the ovaries make and release an egg cell each month  If this egg gets fertilized by sperm, pregnancy occurs  You will need to take the pill at the same time every day  Your healthcare provider will tell you when to start taking the pill  You will also be told what to do if you miss a dose  Instructions will depend on the kind of birth control pills you are taking  What are the different kinds of birth control pills? Some kinds are taken for 21 days in a row, followed by 7 days of placebo (no hormones) pills  Other kinds are taken for 24 days followed by 4 days of placebos  Each kind has a certain amount of female hormones  Your provider will decide on the kind that is best for you based on your age and other health conditions  What may be done before I can start taking birth control pills? You need to see your healthcare provider to get a prescription  Any of the following may be done before your healthcare provider gives you a prescription:  · Your healthcare provider will ask about diseases and illnesses you have had in the past  Your provider will check your risk for blood clots, heart conditions, or stroke  Tell your provider if you had gastric bypass surgery  This surgery can affect the way your body absorbs medicines such as birth control pills  · Your provider will also check your blood pressure, and may do a breast and pelvic exam  A Pap smear may also be done during the pelvic exam  This is a test to make sure you do not have abnormal changes on your cervix  You may need other tests, such as a urine test to make sure you are not pregnant  · Your provider will ask if you take any medicines and if you smoke   Smoking increases your risk for stroke, heart attack, or a blood clot in your lungs  If you smoke, you should not take certain kinds of birth control pills  What are the advantages of birth control pills? When birth control pills are used correctly, the chances of getting pregnant are very low  Birth control pills may help decrease bleeding and pain during your monthly period  They may also help prevent cancer of the uterus and ovaries  What are the disadvantages of birth control pills? You may have sudden changes in your mood or feelings while you take birth control pills  You may have nausea and a decreased sex drive  You may have an increased appetite and rapid weight gain  You may also have bleeding in between periods, less frequent periods, vaginal dryness, and breast pain  Birth control pills will not protect you from sexually transmitted infections  Rarely, some birth control pills can increase your risk for a blood clot  This may become life-threatening  What should I do if I decide I want to get pregnant? If you are planning to have a baby, ask your healthcare provider when you may stop taking your birth control pills  It may take some time for you to start ovulating again  Ask your healthcare provider for more information about pregnancy after birth control pills  When should I start taking birth control pills after I have a baby? If you are not breastfeeding, you may start taking birth control pills 3 weeks after you give birth  You may be able to take certain types of birth control pills if you are breastfeeding  These pills can be started from 6 weeks to 6 months after you give birth  Ask your healthcare provider for more information about when to start taking birth control pills after you give birth  What do I need to know about birth control pills and menopause? · Talk with your healthcare provider if you want to take birth control pills around menopause  · Around age 39, you will enter into perimenopause   This means your hormone levels are dropping and you are ovulating less often  You can still become pregnant during this time  The risk for problems, such as miscarriage, are higher if you become pregnant after age 39  Birth control pills will prevent pregnancy, and may also help prevent or relieve some signs and symptoms of menopause  Examples are hot flashes and mood swings  · Your provider will do tests when you are around age 48  The tests may show that you are in menopause  If the tests do not show menopause for sure, you may be able to continue taking the pill up to age 54  The decision will depend on your health and if you have any medical conditions, such as a blood clot  Call your local emergency number (911 in the [address]) for any of the following:   · You have any of the following signs of a stroke:      ? Numbness or drooping on one side of your face     ? Weakness in an arm or leg    ? Confusion or difficulty speaking    ? Dizziness, a severe headache, or vision loss    · You feel lightheaded, short of breath, and have chest pain  · You cough up blood  When should I seek immediate care? · Your arm or leg feels warm, tender, and painful  It may look swollen and red  · You have severe pain, numbness, or swelling in your arms or legs  When should I call my doctor? · You have forgotten to take a birth control pill  · You have mood changes, such as depression, since starting birth control pills  · You have nausea or are vomiting  · You have severe abdominal pain  · You missed a period and have questions or concerns about being pregnant  · You still have bleeding 4 months after taking birth control pills correctly  · You have questions or concerns about your condition or care  CARE AGREEMENT:   You have the right to help plan your care  Learn about your health condition and how it may be treated  Discuss treatment options with your healthcare providers to decide what care you want to receive   You always have the right to refuse treatment  The above information is an  only  It is not intended as medical advice for individual conditions or treatments  Talk to your doctor, nurse or pharmacist before following any medical regimen to see if it is safe and effective for you  © Copyright [employer] 2021 Information is for End User's use only and may not be sold, redistributed or otherwise used for commercial purposes   All illustrations and images included in CareNotes® are the copyrighted property of A D A M , Inc  or [address] [place] [address]

## 2021-07-23 ENCOUNTER — HOSPITAL ENCOUNTER (OUTPATIENT)
Dept: RADIOLOGY | Facility: MEDICAL CENTER | Age: 51
Discharge: HOME | End: 2021-07-23
Payer: COMMERCIAL

## 2021-07-23 DIAGNOSIS — N85.2 ENLARGED UTERUS: ICD-10-CM

## 2021-07-23 PROCEDURE — 76856 US EXAM PELVIC COMPLETE: CPT

## 2021-07-23 PROCEDURE — 76830 TRANSVAGINAL US NON-OB: CPT

## 2021-12-27 ENCOUNTER — HOSPITAL ENCOUNTER (OUTPATIENT)
Dept: RADIOLOGY | Facility: HOSPITAL | Age: 51
Discharge: HOME | End: 2021-12-27
Payer: COMMERCIAL

## 2021-12-27 VITALS — BODY MASS INDEX: 21.66 KG/M2 | WEIGHT: 138 LBS | HEIGHT: 67 IN

## 2021-12-27 DIAGNOSIS — Z12.31 SCREENING MAMMOGRAM, ENCOUNTER FOR: ICD-10-CM

## 2021-12-27 PROCEDURE — 77063 BREAST TOMOSYNTHESIS BI: CPT

## 2021-12-27 PROCEDURE — 77067 SCR MAMMO BI INCL CAD: CPT

## 2022-06-23 ENCOUNTER — ANNUAL EXAM (OUTPATIENT)
Dept: OBGYN CLINIC | Age: 52
End: 2022-06-23
Payer: COMMERCIAL

## 2022-06-23 VITALS
DIASTOLIC BLOOD PRESSURE: 76 MMHG | HEIGHT: 67 IN | BODY MASS INDEX: 20.88 KG/M2 | WEIGHT: 133 LBS | SYSTOLIC BLOOD PRESSURE: 118 MMHG

## 2022-06-23 DIAGNOSIS — D21.9 FIBROIDS: ICD-10-CM

## 2022-06-23 DIAGNOSIS — Z01.419 ENCOUNTER FOR GYNECOLOGICAL EXAMINATION WITHOUT ABNORMAL FINDING: Primary | ICD-10-CM

## 2022-06-23 DIAGNOSIS — Z12.31 SCREENING MAMMOGRAM FOR BREAST CANCER: ICD-10-CM

## 2022-06-23 DIAGNOSIS — Z30.41 ENCOUNTER FOR SURVEILLANCE OF CONTRACEPTIVE PILLS: ICD-10-CM

## 2022-06-23 PROBLEM — N85.2 ENLARGED UTERUS: Status: RESOLVED | Noted: 2021-06-17 | Resolved: 2022-06-23

## 2022-06-23 PROCEDURE — S0612 ANNUAL GYNECOLOGICAL EXAMINA: HCPCS | Performed by: NURSE PRACTITIONER

## 2022-06-23 RX ORDER — DESOGESTREL AND ETHINYL ESTRADIOL 7 DAYS X 3
1 KIT ORAL DAILY
Qty: 84 TABLET | Refills: 3 | Status: SHIPPED | OUTPATIENT
Start: 2022-06-23

## 2022-06-23 NOTE — PROGRESS NOTES
Diagnoses and all orders for this visit:    Encounter for gynecological examination without abnormal finding    Encounter for surveillance of contraceptive pills  -     desogestrel-ethinyl estradiol (Velivet) 0 1/0 125/0 15 -0 025 MG tablet; Take 1 tablet by mouth daily    Fibroids    Screening mammogram for breast cancer  -     Mammo screening bilateral w 3d & cad; Future      Calcium/vit d inclusion in the diet discussed, call with any issues, SBE reinforced, all concerns addressed  Pleasant 45 y o  premenopausal female here for annual exam  She denies any issues with bleeding or her menses  Reports regular cycles on ocp  Still having occasional hot flashes  Menarche 10  +history of abnormal pap smears and LEEP  Last Pap 2018 neg and HPV neg, no pap today  Denies vaginal issues  Denies pelvic pain  Denies any issues with her BCM  Sexually active without any concerns  Colonoscopy q 5 yrs for IBS, due in 2023  Mammogram due in December 2022  Past Medical History:   Diagnosis Date    Atypical squamous cells cannot exclude high grade squamous intraepithelial lesion on cytologic smear of cervix (ASC-H)     6/1/2009 PAP ASC-H / HPV +   10/2/2008 PAP GUANAKITO- CANNOT RULE OUT A NEOPLASTIC PROCESS / HPV +    LETICIA III (cervical intraepithelial neoplasia grade III) with severe dysplasia     resolved: 7/1/2008  LEEP- 7/1/2009 LETICIA II-III    Fatigue     HPV (human papilloma virus) infection     6/1/2009 PAP ASC-H /HPV +  PAP 11/10/2008 LETICIA I /LGSIL / HPV  10/2/2008 PAP GUANAKITO- CANNOT RULE OUT A NEOPLASTIC PROCESS/ HPV +  11/10/2008 COLPO LETICIA I / LGSIL / HPV POSITIVE      Uterine leiomyoma      Past Surgical History:   Procedure Laterality Date    BUNIONECTOMY      CERVICAL BIOPSY  W/ LOOP ELECTRODE EXCISION      leep - leticia III  Resolved: 7/2009    COLPOSCOPY  12/03/2003    pap smear abnormality of cervix with ASCUS favoring benign       COLPOSCOPY VULVA W/ BIOPSY  11/10/2008    colpo leticia I/ LGSIL/ HPV positive  TONSILLECTOMY AND ADENOIDECTOMY       Family History   Problem Relation Age of Onset    No Known Problems Mother     No Known Problems Father     Arthritis Family     Diabetes Family     Other Family         seizure, urinary incontinence    Varicose Veins Family     No Known Problems Maternal Grandmother     No Known Problems Maternal Aunt     No Known Problems Maternal Aunt     No Known Problems Maternal Grandfather     No Known Problems Paternal Grandmother     No Known Problems Paternal Grandfather     Colon cancer Neg Hx     Ovarian cancer Neg Hx     Uterine cancer Neg Hx     Cervical cancer Neg Hx      Social History     Tobacco Use    Smoking status: Never Smoker    Smokeless tobacco: Never Used   Vaping Use    Vaping Use: Never used   Substance Use Topics    Alcohol use: Yes     Comment: social    Drug use: No       Current Outpatient Medications:     ALPRAZolam (XANAX) 0 25 mg tablet, Take 0 25 mg by mouth Three times a day, Disp: , Rfl:     azelastine (OPTIVAR) 0 05 % ophthalmic solution, , Disp: , Rfl:     cyanocobalamin 1,000 mcg/mL, Inject 1,000 mcg under the skin once a week, Disp: , Rfl: 3    cyclobenzaprine (FLEXERIL) 10 mg tablet, TAKE 1 TABLET EVERY DAY, Disp: , Rfl:     desogestrel-ethinyl estradiol (Velivet) 0 1/0 125/0 15 -0 025 MG tablet, Take 1 tablet by mouth daily, Disp: 84 tablet, Rfl: 3  Patient Active Problem List    Diagnosis Date Noted    Fibroids 2022    Encounter for surveillance of contraceptive pills 2018    Dysmenorrhea 2012       Allergies   Allergen Reactions    Sulfa Antibiotics        OB History    Para Term  AB Living   1 1 1     1   SAB IAB Ectopic Multiple Live Births           1      # Outcome Date GA Lbr Amado/2nd Weight Sex Delivery Anes PTL Lv   1 Term               Obstetric Comments         Works for [employer] in [place] but is remote currently  Son is 28 yrs old, no grands    Vitals:    22 7818 BP: 118/76   BP Location: Left arm   Patient Position: Sitting   Cuff Size: Standard   Weight: 60 3 kg (133 lb)   Height: 5' 7" (1 702 m)     Body mass index is 20 83 kg/m²  Review of Systems   Constitutional: Negative for chills, fatigue, fever and unexpected weight change  Respiratory: Negative for shortness of breath  Gastrointestinal: Negative for anal bleeding, blood in stool, constipation and diarrhea  Genitourinary: Negative for difficulty urinating, dysuria and hematuria  Physical Exam   Constitutional: She appears well-developed and well-nourished  No distress  HENT:   Head: Normocephalic  Neck: Normal range of motion  Neck supple  Pulmonary: Effort normal   Breasts: bilateral without masses, skin changes or nipple discharge  Bilaterally soft and warm to touch  No areas of erythema or pain  Abdominal: Soft  Pelvic exam was performed with patient supine  No labial fusion  There is no rash, tenderness, lesion or injury on the right labia  There is no rash, tenderness, lesion or injury on the left labia  Urethral meatus does not show any tenderness, inflammation or discharge  Palpation of midline bladder without pain or discomfort  Uterus is not deviated, + fibroids on u/s in 2021, not fixed and not tender  Cervix exhibits no motion tenderness, no discharge and no friability  LEEP cervix  Right adnexum displays no mass, no tenderness and no fullness  Left adnexum displays no mass, no tenderness and no fullness  No erythema or tenderness in the vagina  No foreign body in the vagina  No signs of injury around the vagina  No vaginal discharge found  No signs of injury around the vagina or anus  Perineum without lesions, signs of injury, erythema or swelling  Lymphadenopathy:        Right: No inguinal adenopathy present  Left: No inguinal adenopathy present

## 2022-06-23 NOTE — PROGRESS NOTES
Patient presents for: Yearly    Menarche-  13  Last Pap Smear- 05/18/2018  Hx of abnormal paps- Never  Birth control- Velivet    Mammogram- 12/27/2021  DXA- Never   Colonoscopy- 10/26/18    Smoking status- never  Last sexual activity- yes  Family history of uterine, ovarian, cervical or breast cancer-  none  Concerns- no questions or concerns today Needs Refill on Birthcontrol

## 2022-06-23 NOTE — PATIENT INSTRUCTIONS
Breast Self Exam for Women   AMBULATORY CARE:   A breast self-exam (BSE)  is a way to check your breasts for lumps and other changes  Regular BSEs can help you know how your breasts normally look and feel  Most breast lumps or changes are not cancer, but you should always have them checked by a healthcare provider  Why you should do a BSE:  Breast cancer is the most common type of cancer in women  Even if you have mammograms, you may still want to do a BSE regularly  If you know how your breasts normally feel and look, it may help you know when to contact your healthcare provider  Mammograms can miss some cancers  You may find a lump during a BSE that did not show up on a mammogram   When you should do a BSE:  If you have periods, you may want to do your BSE 1 week after your period ends  This is the time when your breasts may be the least swollen, lumpy, or tender  You can do regular BSEs even if you are breastfeeding or have breast implants  Call your doctor if:   You find any lumps or changes in your breasts  You have breast pain or fluid coming from your nipples  You have questions or concerns about your condition or care  How to do a BSE:       Look at your breasts in a mirror  Look at the size and shape of each breast and nipple  Check for swelling, lumps, dimpling, scaly skin, or other skin changes  Look for nipple changes, such as a nipple that is painful or beginning to pull inward  Gently squeeze both nipples and check to see if fluid (that is not breast milk) comes out of them  If you find any of these or other breast changes, contact your healthcare provider  Check your breasts while you sit or  the following 3 positions:    [place] your arms down at your sides  Raise your hands and join them behind your head  Put firm pressure with your hands on your hips  Bend slightly forward while you look at your breasts in the mirror  Lie down and feel your breasts    When you lie down, your breast tissue spreads out evenly over your chest  This makes it easier for you to feel for lumps and anything that may not be normal for your breasts  Do a BSE on one breast at a time  Place a small pillow or towel under your left shoulder  Put your left arm behind your head  Use the 3 middle fingers of your right hand  Use your fingertip pads, on the top of your fingers  Your fingertip pad is the most sensitive part of your finger  Use small circles to feel your breast tissue  Use your fingertip pads to make dime-sized, overlapping circles on your breast and armpits  Use light, medium, and firm pressure  First, press lightly  Second, press with medium pressure to feel a little deeper into the breast  Last, use firm pressure to feel deep within your breast     Examine your entire breast area  Examine the breast area from above the breast to below the breast where you feel only ribs  Make small circles with your fingertips, starting in the middle of your armpit  Make circles going up and down the breast area  Continue toward your breast and all the way across it  Examine the area from your armpit all the way over to the middle of your chest (breastbone)  Stop at the middle of your chest     Move the pillow or towel to your right shoulder, and put your right arm behind your head  Use the 3 fingertip pads of your left hand, and repeat the above steps to do a BSE on your right breast     What else you can do to check for breast problems or cancer:  Talk to your healthcare provider about mammograms  A mammogram is an x-ray of your breasts to screen for breast cancer or other problems  Your provider can tell you the benefits and risks of mammograms  The first mammogram is usually at age 39 or 48  Your provider may recommend you start at 36 or younger if your risk for breast cancer is high  Mammograms usually continue every 1 to 2 years until age 76         Follow up with your doctor as directed:  Write down your questions so you remember to ask them during your visits  © Copyright [employer] 2022 Information is for End User's use only and may not be sold, redistributed or otherwise used for commercial purposes  All illustrations and images included in CareNotes® are the copyrighted property of A D A M [employer] Inc  or Arjun Dunham  The above information is an  only  It is not intended as medical advice for individual conditions or treatments  Talk to your doctor, nurse or pharmacist before following any medical regimen to see if it is safe and effective for you

## 2023-01-13 ENCOUNTER — TELEPHONE (OUTPATIENT)
Dept: OBGYN CLINIC | Facility: CLINIC | Age: 53
End: 2023-01-13

## 2023-01-13 DIAGNOSIS — Z30.41 ENCOUNTER FOR SURVEILLANCE OF CONTRACEPTIVE PILLS: ICD-10-CM

## 2023-01-13 RX ORDER — DESOGESTREL AND ETHINYL ESTRADIOL 7 DAYS X 3
1 KIT ORAL DAILY
Qty: 84 TABLET | Refills: 2 | Status: CANCELLED | OUTPATIENT
Start: 2023-01-13

## 2023-01-16 DIAGNOSIS — Z30.41 ENCOUNTER FOR SURVEILLANCE OF CONTRACEPTIVE PILLS: ICD-10-CM

## 2023-01-16 RX ORDER — DESOGESTREL AND ETHINYL ESTRADIOL 7 DAYS X 3
1 KIT ORAL DAILY
Qty: 84 TABLET | Refills: 1 | Status: SHIPPED | OUTPATIENT
Start: 2023-01-16 | End: 2023-04-10

## 2023-01-18 NOTE — TELEPHONE ENCOUNTER
Pt is stating mail order [facility] - her prescription needs a prior auth - for her oc's. She thinks it is new for her insurance. [phone] If she needs to do anything else, please let her know.

## 2023-02-08 ENCOUNTER — TELEPHONE (OUTPATIENT)
Dept: OBGYN CLINIC | Facility: CLINIC | Age: 53
End: 2023-02-08

## 2023-02-08 DIAGNOSIS — Z30.41 ENCOUNTER FOR SURVEILLANCE OF CONTRACEPTIVE PILLS: ICD-10-CM

## 2023-02-08 RX ORDER — DESOGESTREL AND ETHINYL ESTRADIOL 7 DAYS X 3
KIT ORAL
Qty: 84 TABLET | Refills: 1 | Status: SHIPPED | OUTPATIENT
Start: 2023-02-08

## 2023-02-08 RX ORDER — DESOGESTREL AND ETHINYL ESTRADIOL 0.15-0.03
1 KIT ORAL DAILY
Status: CANCELLED | OUTPATIENT
Start: 2023-02-08

## 2023-02-08 NOTE — TELEPHONE ENCOUNTER
[facility] called stating that a new script needs to be sent to [facility] that states no brandname necessary for the [place] prescription in order for insurance to cover

## 2023-03-03 ENCOUNTER — HOSPITAL ENCOUNTER (OUTPATIENT)
Dept: RADIOLOGY | Facility: MEDICAL CENTER | Age: 53
Discharge: HOME | End: 2023-03-03

## 2023-03-03 VITALS — WEIGHT: 133 LBS | BODY MASS INDEX: 20.88 KG/M2 | HEIGHT: 67 IN

## 2023-03-03 DIAGNOSIS — Z12.31 ENCOUNTER FOR SCREENING MAMMOGRAM FOR MALIGNANT NEOPLASM OF BREAST: ICD-10-CM

## 2023-03-03 DIAGNOSIS — Z12.31 SCREENING MAMMOGRAM FOR BREAST CANCER: ICD-10-CM

## 2023-09-26 ENCOUNTER — ANNUAL EXAM (OUTPATIENT)
Age: 53
End: 2023-09-26
Payer: COMMERCIAL

## 2023-09-26 VITALS
BODY MASS INDEX: 20.72 KG/M2 | WEIGHT: 132 LBS | HEIGHT: 67 IN | DIASTOLIC BLOOD PRESSURE: 78 MMHG | SYSTOLIC BLOOD PRESSURE: 104 MMHG

## 2023-09-26 DIAGNOSIS — D21.9 FIBROIDS: ICD-10-CM

## 2023-09-26 DIAGNOSIS — N95.1 MENOPAUSAL SYMPTOMS: ICD-10-CM

## 2023-09-26 DIAGNOSIS — Z12.31 SCREENING MAMMOGRAM FOR BREAST CANCER: ICD-10-CM

## 2023-09-26 DIAGNOSIS — Z01.419 ENCOUNTER FOR GYNECOLOGICAL EXAMINATION WITHOUT ABNORMAL FINDING: Primary | ICD-10-CM

## 2023-09-26 PROBLEM — Z30.41 ENCOUNTER FOR SURVEILLANCE OF CONTRACEPTIVE PILLS: Status: RESOLVED | Noted: 2018-05-18 | Resolved: 2023-09-26

## 2023-09-26 PROCEDURE — G0145 SCR C/V CYTO,THINLAYER,RESCR: HCPCS | Performed by: NURSE PRACTITIONER

## 2023-09-26 PROCEDURE — S0612 ANNUAL GYNECOLOGICAL EXAMINA: HCPCS | Performed by: NURSE PRACTITIONER

## 2023-09-26 PROCEDURE — G0476 HPV COMBO ASSAY CA SCREEN: HCPCS | Performed by: NURSE PRACTITIONER

## 2023-09-26 RX ORDER — LEVOTHYROXINE SODIUM 0.05 MG/1
TABLET ORAL
COMMUNITY
Start: 2023-02-01

## 2023-09-26 NOTE — PATIENT INSTRUCTIONS
Breast Self Exam for Women   AMBULATORY CARE:   A breast self-exam (BSE)  is a way to check your breasts for lumps and other changes. Regular BSEs can help you know how your breasts normally look and feel. Most breast lumps or changes are not cancer, but you should always have them checked by a healthcare provider. Why you should do a BSE:  Breast cancer is the most common type of cancer in women. Even if you have mammograms, you may still want to do a BSE regularly. If you know how your breasts normally feel and look, it may help you know when to contact your healthcare provider. Mammograms can miss some cancers. You may find a lump during a BSE that did not show up on a mammogram.  When you should do a BSE:  If you have periods, you may want to do your BSE 1 week after your period ends. This is the time when your breasts may be the least swollen, lumpy, or tender. You can do regular BSEs even if you are breastfeeding or have breast implants. Call your doctor if:   You find any lumps or changes in your breasts. You have breast pain or fluid coming from your nipples. You have questions or concerns about your condition or care. How to do a BSE:       Look at your breasts in a mirror. Look at the size and shape of each breast and nipple. Check for swelling, lumps, dimpling, scaly skin, or other skin changes. Look for nipple changes, such as a nipple that is painful or beginning to pull inward. Gently squeeze both nipples and check to see if fluid (that is not breast milk) comes out of them. If you find any of these or other breast changes, contact your healthcare provider. Check your breasts while you sit or  the following 3 positions:    [place] your arms down at your sides. Raise your hands and join them behind your head. Put firm pressure with your hands on your hips. Bend slightly forward while you look at your breasts in the mirror. Lie down and feel your breasts.   When you lie down, your breast tissue spreads out evenly over your chest. This makes it easier for you to feel for lumps and anything that may not be normal for your breasts. Do a BSE on one breast at a time. Place a small pillow or towel under your left shoulder. Put your left arm behind your head. Use the 3 middle fingers of your right hand. Use your fingertip pads, on the top of your fingers. Your fingertip pad is the most sensitive part of your finger. Use small circles to feel your breast tissue. Use your fingertip pads to make dime-sized, overlapping circles on your breast and armpits. Use light, medium, and firm pressure. First, press lightly. Second, press with medium pressure to feel a little deeper into the breast. Last, use firm pressure to feel deep within your breast.    Examine your entire breast area. Examine the breast area from above the breast to below the breast where you feel only ribs. Make small circles with your fingertips, starting in the middle of your armpit. Make circles going up and down the breast area. Continue toward your breast and all the way across it. Examine the area from your armpit all the way over to the middle of your chest (breastbone). Stop at the middle of your chest.    Move the pillow or towel to your right shoulder, and put your right arm behind your head. Use the 3 fingertip pads of your left hand, and repeat the above steps to do a BSE on your right breast.  What else you can do to check for breast problems or cancer:  Talk to your healthcare provider about mammograms. A mammogram is an x-ray of your breasts to screen for breast cancer or other problems. Your provider can tell you the benefits and risks of mammograms. The first mammogram is usually at age 39 or 48. Your provider may recommend you start at 36 or younger if your risk for breast cancer is high. Mammograms usually continue every 1 to 2 years until age 76.        Follow up with your doctor as directed:  Write down your questions so you remember to ask them during your visits. © Copyright [employer] 2023 Information is for End User's use only and may not be sold, redistributed or otherwise used for commercial purposes. The above information is an  only. It is not intended as medical advice for individual conditions or treatments. Talk to your doctor, nurse or pharmacist before following any medical regimen to see if it is safe and effective for you.

## 2023-09-26 NOTE — PROGRESS NOTES
Diagnoses and all orders for this visit:    Encounter for gynecological examination without abnormal finding  -     Liquid-based pap, screening    Menopausal symptoms    Fibroids    Screening mammogram for breast cancer  -     Mammo screening bilateral w 3d & cad; Future    Other orders  -     Herbal list for hot flashes given, start a menses calendar, f/u in 6 months      Calcium/vit d inclusion in the diet discussed, call with any issues, SBE reinforced, all concerns addressed. Pleasant 52 y.o. possibly menopausal female here for annual exam. LMP 3/2023 on ocp which she then stopped. She denies any issues with bleeding or her menses. Reports amenorrhea x 6 months. Still having hot flashes and insomnia. She would like to try herbals for this. Menarche 10. +history of abnormal pap smears and LEEP Hx. Last Pap 2018 neg and HPV neg, a pap with cotest was done today. Denies vaginal issues. Denies pelvic pain or dyspareunia. Sexually active without any concerns. Colonoscopy q 5 yrs for IBS, done 10/26/2018, she will call for an appointment. Mammogram done 03/03/2023 nml    Past Medical History:   Diagnosis Date   • Atypical squamous cells cannot exclude high grade squamous intraepithelial lesion on cytologic smear of cervix (ASC-H)     6/1/2009 PAP ASC-H / HPV + . 10/2/2008 PAP GUA[place]KITO- CANNOT RULE OUT A NEOPLASTIC PROCESS / HPV +   • Basal cell carcinoma    • LETICIA III (cervical intraepithelial neoplasia grade III) with severe dysplasia     resolved: 7/1/2008. LEEP- 7/1/2009 LETICIA II-III   • Fatigue    • HPV (human papilloma virus) infection     6/1/2009 PAP ASC-H /HPV +. PAP 11/10/2008 LETICIA I /LGSIL / HPV. 10/2/2008 PAP GUANAKITO- CANNOT RULE OUT A NEOPLASTIC PROCESS/ HPV +. 11/10/2008 COLPO LETICIA I / LGSIL / HPV POSITIVE     • Uterine leiomyoma      Past Surgical History:   Procedure Laterality Date   • BUNIONECTOMY     • CERVICAL BIOPSY  W/ LOOP ELECTRODE EXCISION      leep - leticia III.  Resolved: 7/2009   • COLPOSCOPY 2003    pap smear abnormality of cervix with ASCUS favoring benign. • COLPOSCOPY VULVA W/ BIOPSY  11/10/2008    colpo leticia I/ LGSIL/ HPV positive   • TONSILLECTOMY AND ADENOIDECTOMY       Family History   Problem Relation Age of Onset   • No Known Problems Mother    • No Known Problems Father    • Arthritis Family    • Diabetes Family    • Other Family         seizure, urinary incontinence   • Varicose Veins Family    • No Known Problems Maternal Grandmother    • No Known Problems Maternal Aunt    • No Known Problems Maternal Aunt    • No Known Problems Maternal Grandfather    • No Known Problems Paternal Grandmother    • No Known Problems Paternal Grandfather    • Colon cancer Neg Hx    • Ovarian cancer Neg Hx    • Uterine cancer Neg Hx    • Cervical cancer Neg Hx      Social History     Tobacco Use   • Smoking status: Never   • Smokeless tobacco: Never   • Tobacco comments:     Never smoked   Vaping Use   • Vaping Use: Never used   Substance Use Topics   • Alcohol use:  Yes     Alcohol/week: 7.0 standard drinks of alcohol     Types: 1 Glasses of wine, 6 Cans of beer per week     Comment: social   • Drug use: No       Current Outpatient Medications:   •  ALPRAZolam (XANAX) 0.25 mg tablet, Take 0.25 mg by mouth Three times a day, Disp: , Rfl:   •  cyclobenzaprine (FLEXERIL) 10 mg tablet, TAKE 1 TABLET EVERY DAY, Disp: , Rfl:   •  levothyroxine 50 mcg tablet, , Disp: , Rfl:   Patient Active Problem List    Diagnosis Date Noted   • Fibroids 2022       Allergies   Allergen Reactions   • Sulfa Antibiotics        OB History    Para Term  AB Living   1 1 1     1   SAB IAB Ectopic Multiple Live Births           1      # Outcome Date GA Lbr Amado/2nd Weight Sex Delivery Anes PTL Lv   1 Term               Obstetric Comments         Work[place] for [employer] [place] but now works for the [employer] in [employer]  Son is 35 yrs old, no grands    Vitals:    23 1103   BP: 104/78   BP Location: Left arm   Patient Position: Sitting   Cuff Size: Standard   Weight: 59.9 kg (132 lb)   Height: 5' 7" (1.702 m)     Body mass index is 20.67 kg/m². Review of Systems   Constitutional: Negative for chills, fatigue, fever and unexpected weight change. Respiratory: Negative for shortness of breath. Gastrointestinal: Negative for anal bleeding or blood in stool, +IBS constipation and diarrhea. Genitourinary: Negative for difficulty urinating, dysuria and hematuria. Physical Exam   Constitutional: She appears well-developed and well-nourished. No distress. HENT:  Asymptomatic, EOMI  Head: Normocephalic. Neck: Normal range of motion. Neck supple. Pulmonary: Effort normal.  Breasts: bilateral without masses, skin changes or nipple discharge. Bilaterally soft and warm to touch. No areas of erythema or pain. Abdominal: Soft. Pelvic exam was performed with patient supine. No labial fusion. There is no rash, tenderness, lesion or injury on the right labia. There is no rash, tenderness, lesion or injury on the left labia. Urethral meatus does not show any tenderness, inflammation or discharge. Palpation of midline bladder without pain or discomfort. Uterus is not deviated, + fibroids on u/s in 2021, not fixed and not tender. Cervix exhibits no motion tenderness, no discharge and no friability. LEEP cervix. Right adnexum displays no mass, no tenderness and no fullness. Left adnexum displays no mass, no tenderness and no fullness. No erythema or tenderness in the vagina. No foreign body in the vagina. No signs of injury around the vagina. No vaginal discharge found. No signs of injury around the vagina or anus. Perineum without lesions, signs of injury, erythema or swelling. Lymphadenopathy:        Right: No inguinal adenopathy present. Left: No inguinal adenopathy present.

## 2023-09-27 LAB
HPV HR 12 DNA CVX QL NAA+PROBE: NEGATIVE
HPV16 DNA CVX QL NAA+PROBE: NEGATIVE
HPV18 DNA CVX QL NAA+PROBE: NEGATIVE

## 2023-10-05 LAB
LAB AP GYN PRIMARY INTERPRETATION: NORMAL
Lab: NORMAL

## 2023-12-06 ENCOUNTER — TELEPHONE (OUTPATIENT)
Dept: GASTROENTEROLOGY | Facility: CLINIC | Age: 53
End: 2023-12-06

## 2023-12-06 NOTE — TELEPHONE ENCOUNTER
Pt is due for a colonoscopy with Dr Hailey Wheeler for a hx of adenomatous polyps. I lmom for pt to please call back to schedule a colonoscopy with Dr. Destini Adhikari.  Will call pt again if do not hear back from her.

## 2023-12-14 NOTE — TELEPHONE ENCOUNTER
I lmom for pt to please call back to schedule a colonoscopy with Dr Sayda Junior.  Will call again if do not hear back from pt.

## 2024-09-24 ENCOUNTER — TELEPHONE (OUTPATIENT)
Age: 54
End: 2024-09-24

## 2024-09-24 ENCOUNTER — PREP FOR PROCEDURE (OUTPATIENT)
Age: 54
End: 2024-09-24

## 2024-09-24 DIAGNOSIS — Z86.010 HISTORY OF COLON POLYPS: Primary | ICD-10-CM

## 2024-09-24 DIAGNOSIS — Z86.0100 HISTORY OF COLON POLYPS: Primary | ICD-10-CM

## 2024-09-24 NOTE — TELEPHONE ENCOUNTER
09/24/24  Screened by: Molly Roberts MA    Referring Provider Dr. Taylor    Pre- Screening:     There is no height or weight on file to calculate BMI.20.67  Has patient been referred for a routine screening Colonoscopy? yes  Is the patient between 45-75 years old? yes      Previous Colonoscopy yes   If yes:    Date: 2018    Facility:     Reason:         Does the patient want to see a Gastroenterologist prior to their procedure OR are they having any GI symptoms? no    Has the patient been hospitalized or had abdominal surgery in the past 6 months? no    Does the patient use supplemental oxygen? no    Does the patient take Coumadin, Lovenox, Plavix, Elliquis, Xarelto, or other blood thinning medication? no    Has the patient had a stroke, cardiac event, or stent placed in the past year? no      If patient is between 45yrs - 49yrs, please advise patient that we will have to confirm benefits & coverage with their insurance company for a routine screening colonoscopy.

## 2024-09-24 NOTE — LETTER
Abby Aleman,    Attached are your prep instructions for your upcoming Colonoscopy scheduled on 12/13/24 with Dr. Andres. The GI lab will be calling the day before between 2pm and 6pm with your arrival time for your procedure. If you have any questions, please feel free to contact our office at [phone].    Address to our location:  [facility], [address] [place] [address], [id number]    Thank you for choosing [facility]!                                                                               COLONOSCOPY  MIRALAX/Dulcolax Bowel Preparation Instructions    The OR/GI Lab will contact you the evening prior to your procedure with your exact arrival time.    Our practice requires a 1 week notice for any cancellations or rescheduling. We kindly ask that you immediately notify us of any changes including any new medications that are prescribed. Thank you for your cooperation.     WEEK BEFORE YOUR PROCEDURE:  Stop taking Iron tablets.  5 days prior, AVOID vegetables and fruits with skins or seeds, nuts, corn, popcorn and whole grain breads.   Purchase: One (1) 238-gram container of Miralax (polyethylene glycol 3350), four (4) 5 mg Dulcolax (bisacodyl) tablets, and one (1) 64-ounce bottle of Gatorade (sports drink) - no red, orange, or purple. These may be purchased at any pharmacy without a prescription. Generic products are permissible.   Arrange responsible transportation for day of the procedure.     DAY BEFORE THE PROCEDURE:   CLEAR liquids only for entire day prior. Nothing red, orange or purple.    You MAY have:                                                               Soda  Water  Broth Gatorade  Jello  Popsicles Coffee/tea without milk/creamer     YOU MAY NOT HAVE:  Solid foods   Milk and milk products    Juice with pulp    BOWEL PREPARATION:  Includes: One (1) 238-gram container of Miralax (polyethylene glycol 3350), four  (4) 5 mg Dulcolax (bisacodyl) tablets, and one (1) 64-ounce bottle of Gatorade (sports drink).  Preparation may be refrigerated.  Entire bowel prep should be completed.     Afternoon before the procedure (2:00 pm - 5:00 pm):    Take two (2) 5 mg Dulcolax laxative tablets.     Evening before the procedure (6:00 pm):  Mix entire container of Miralax with one (1) 64-ounce bottle of Gatorade and shake until all medication is dissolved.   Begin drinking solution. Drink an eight (8) ounce cup every 10-15 minutes until you have consumed half (32 ounces) of the solution.  Refrigerate remaining solution.    Night before the procedure (8:00 pm):  Take two (2) 5 mg Dulcolax laxative tablets.     Beginning 5 hours before your procedure:  Drink the remaining amount of prepared solution (32 ounces).  Drink an eight (8) ounce cup every 10-15 minutes until you have consumed the remaining solution.     Bowel prep should be completed 4 hours prior to procedure time.    NOTHING TO EAT OR DRINK AFTER MIDNIGHT- EXCEPT FOR YOUR PREP    DAY OF THE PROCEDURE:  You may brush your teeth.  Leave all jewelry at home.  Please arrive for your procedure as indicated by the OR / GI Lab / Endoscopy Unit. The hospital will contact you the day before with your exact arrival time.   Make sure you have arranged ahead of time for a responsible adult (18 or older) to accompany and drive you home after the procedure.  Please discuss any transportation concerns with our staff prior to your procedure.    The effects of the anesthesia can persist for 24 hours.  After receiving the sedation, you must exercise caution before engaging in any activity that could harm yourself and others (such as driving a car).  Do not make any important decisions or do not drink any alcoholic beverages during this time period.  After your procedure, you may have anything you'd like to eat or drink.  You will probably want to start with something light.  Please include plenty of  fluids.  Avoid items that cause gas such as sodas and salads.    SPECIAL INSTRUCTIONS:    For patients currently taking blood thinners and/or antiplatelet therapy our office will contact the prescribing provider.  Our office will contact you with any required changes to your medication regimen.     Blood thinner (i.e. - Coumadin, Pradaxa, Lovenox, Xarelto, Eliquis)  ?  Continue (Do Not Stop)  ? Stop______________for_____________days prior to the procedure.    Antiplatelet (i.e. - Plavix, Aggrenox, Effient, Brilinta)  ?  Continue (Do Not Stop)  ? Stop______________for_____________days prior to the procedure.       Diabetes:   If you are Diabetic, please see separate Diabetic Instruction Sheet.          Prescribed medications:  Do not stop your aspirin, or any of your other medications (unless instructed otherwise).    Take the rest of your prescribed medications with small sips of water at least 2 hours prior to your procedure.      For any questions or concerns related to your bowel preparation or pre-procedure instructions, please contact our office at [phone].  Thank you for choosing [facility]!              Medicine Instructions for Adults with Diabetes who Need a Bowel Prep       Follow these instructions when a BOWEL PREP is required for your procedure or surgery!    NOTE:   GLP-1 Agonists taken weekly: do not take in the 7 days before your procedure   SGLT-2 Inhibitors: do not take in the 4 days before your procedure     On the Day Before Surgery/Procedure  If you are having a procedure (e.g. Colonoscopy) or surgery that requires a bowel prep and you may have at least a clear liquid diet, follow the directions below based on the type of medicine you take for your diabetes.     Type of Medicine You Take Examples What to do   Pre-Mixed Insulin - Intermediate Acting Humalog® 75/25, Humulin® 70/30, Novolog® 70/30, Regular Insulin Take ½ your regular dose the evening before your procedure    Rapid/Fast Acting Insulin Humalog® U200, NovoLog®, Apidra®, Fiasp® Take ½ your regular dose the evening before your procedure.   Long-Acting Insulin Lantus®, Levemir®, Tresiba®, Toujeo®, Basaglar® Take your FULL regular dose the day before procedure   Oral Sulfonylurea Glipizide/Glimepiride/Glucotrol® Take ½ your regular dose the evening before your procedure   Other Oral Diabetes Medicines Metformin®, Glucophage®, Glucophage XR®, Riomet®, Glumetza®), Actose®, Avandia®, Glyset®, Prandin® Take your regular dose with dinner in the evening before your procedure   GLP-1 Agonists AdlyxinÒ, ByettaÒ, BydureonÒ, OzempicÒ, SoliquaÒ, TanzeumÒ, TrulicityÒ, VictozaÒ, Saxenda®, Rybelsus® If taken daily, take as normal    If taken weekly, do not take this medicine for 7 days before your procedure including the day of the procedure (resume taking after the procedure)   SGLT-2 Inhibitors Jardiance®, Invokana®, Farxiga®,   Steglatro®, Brenzavvy®, Qtern®, Segluromet®, Glyxambi®, Synjardy®, Synjardy XR®, Invokamet®, Invokamet XR®, Trijary XR®, Xigduo XR®, Steglujan® Do not take for 4 days before your procedure including the day of the procedure (resume taking after the procedure)                More information continued on back                                Medicine Instructions for Adults with Diabetes who Need a Bowel Prep  Page 2      On the Day of Surgery/Procedure  Follow the directions below based on the type of medicine you take for your diabetes.     Type of Medicine You Take Examples What to do   Long-Acting Insulin Lantus®, Levemir®, Tresiba®, Toujeo®, Basaglar®, Semglee®   If you usually take your Long-Acting Insulin in the morning, take the full dose as scheduled.   GLP-1 Agonists AdlyxinÒ, ByettaÒ, BydureonÒ, OzempicÒ, SoliquaÒ, TanzeumÒ, TrulicityÒ, VictozaÒ, Saxenda®, Rybelsus® Do NOT take this medicine on the day of your procedure (resume taking after the procedure)       On the Day of Surgery/Procedure  (continued)  Except for the morning Long-Acting Insulin, DO NOT take ANY diabetic medicine on the day of your procedure unless you were instructed by the doctor who manages your diabetes medicines.    Continue to check your blood sugars.  If you have an insulin pump, ask your endocrinologist for instructions at least 3 days before your procedure. NOTE: If you are not able to ask your endocrinologist in advance, on the day of the procedure set your insulin pump to your basal rate only. Bring your insulin pump supplies to the hospital.     If you have any questions about taking your diabetes medicines prior to your procedure, please contact the doctor who manages your diabetes medicines.

## 2024-10-04 ENCOUNTER — RA CDI HCC (OUTPATIENT)
Dept: OTHER | Facility: HOSPITAL | Age: 54
End: 2024-10-04

## 2024-10-07 ENCOUNTER — HOSPITAL ENCOUNTER (OUTPATIENT)
Dept: RADIOLOGY | Facility: MEDICAL CENTER | Age: 54
Discharge: HOME | End: 2024-10-07
Payer: COMMERCIAL

## 2024-10-07 VITALS — BODY MASS INDEX: 20.72 KG/M2 | WEIGHT: 132 LBS | HEIGHT: 67 IN

## 2024-10-07 DIAGNOSIS — Z12.31 SCREENING MAMMOGRAM FOR BREAST CANCER: ICD-10-CM

## 2024-10-07 PROCEDURE — 77063 BREAST TOMOSYNTHESIS BI: CPT

## 2024-10-07 PROCEDURE — 77067 SCR MAMMO BI INCL CAD: CPT

## 2024-10-09 NOTE — TELEPHONE ENCOUNTER
Scheduled date of colonoscopy (as of today): 12/13/24    Physician performing colonoscopy: Dr. Andres    Location of colonoscopy: [place]

## 2024-10-16 ENCOUNTER — OFFICE VISIT (OUTPATIENT)
Dept: GASTROENTEROLOGY | Facility: CLINIC | Age: 54
End: 2024-10-16
Payer: COMMERCIAL

## 2024-10-16 VITALS
HEIGHT: 67 IN | SYSTOLIC BLOOD PRESSURE: 139 MMHG | WEIGHT: 124.4 LBS | BODY MASS INDEX: 19.53 KG/M2 | HEART RATE: 84 BPM | DIASTOLIC BLOOD PRESSURE: 96 MMHG

## 2024-10-16 DIAGNOSIS — R19.7 DIARRHEA, UNSPECIFIED TYPE: ICD-10-CM

## 2024-10-16 DIAGNOSIS — R10.9 ABDOMINAL CRAMPING: Primary | ICD-10-CM

## 2024-10-16 DIAGNOSIS — R14.0 BLOATING: ICD-10-CM

## 2024-10-16 PROCEDURE — 99214 OFFICE O/P EST MOD 30 MIN: CPT | Performed by: NURSE PRACTITIONER

## 2024-10-16 RX ORDER — DICYCLOMINE HYDROCHLORIDE 10 MG/1
10 CAPSULE ORAL 2 TIMES DAILY
Qty: 60 CAPSULE | Refills: 1 | Status: SHIPPED | OUTPATIENT
Start: 2024-10-16

## 2024-10-16 NOTE — PATIENT INSTRUCTIONS
-Get blood work, stool testing done ASAP  -Trial dairy free diet to see if this helps with your symptoms  -After you do the blood work to check for celiac disease you can trial a gluten free diet  -If removing dairy or gluten from the diet is ineffective you can try the low fodmap diet below  -Continue gas-x for bloating as needed  -Follow up with OB/gyn      Low FODMAPs Diet     You need a diet that limits, but does not eliminate foods that contain: lactose, fructose, fructans, galactans, and sugar alcohols (polyols).  This is often referred to as a low FODMAPs diet as it is low in fermetable oligo-, di-, and monosaccharides and polyls (FODMAPs).  The low FODMAPs diet will help minimize symptoms, such as bloating, cramping, burping, flatulence, and constipation and/or diarrhea, that are often associated with Irritable Bowel Syndrome (IBS).  Because there are different levels of intolerance, you need to eliminate foods high in FODMAPs for 6-8 weeks and then gradually reintroduce foods to identify bothersome foods.  Reintroduce one food every four days with a 2-week break between bothersome foods.  The end result is to identify the threshold that you are able to consume FODMAP containing foods without causing bothersome GI symptoms.     Type of Food High in FODMAPs Low in FODMAPs   Milk -Milk: Cow, Sheep, Goat, Soy  -Creamy soups made with    milk  -Evaporated milk  -Sweetened condensed milk -Milk: [place], [place]ut, Hazelnut, Hemp, Rice  -Lactose free cow's milk  -Lactose free cj  -Lactose free ice cream    (non-dairy alternatives)  -Purchase lactase enzyme to  make your own evaporated or  condensed milk if needed   Yogurt -Cow's milk yogurt (Greek yogurt is lowest in FODMAPs)  -Soy yogurt -[place]oconut milk yogurt   Cheese -Cottage cheese  -Ricotta cheese  -[place] cheese -Hard cheeses including  cheddar, Swiss, brie, blue  cheese, mozzarella, parmesan,  and feta  -No more than 2 tablespoons ricotta or cottage  cheese  -Lactose free cottage cheese   Dairy-based  Condiments -Sour cream  -Whipping cream -Butter  -Half and half  -Cream cheese   Dairy-based desserts -Ice cream  -Frozen yogurt  -[place] -Sorbet from FODMAPs friendly fruit   Fruit -Apples, Pears  -Cherries, Raspberries,  Blackberries  -Watermelon  -Nectarines, White peaches, Apricots, Plums  -Peaches  -Prunes  -Dino, Papaya  -Persimmon  -Orange juice  -Canned fruit  -Large portions of any fruit -Banana  -Blueberries, Strawberries  -Cantaloupe, Honeydew  -Grapefruit, Lemon, Lime  -Grapes  -Kiwi  -Pineapple  -Rhubarb  -Tangelos  - <1/4 avocado  - <1 tablespoon dried fruit     Limit consumption to one low FODMAPs fruit per meal.  Consume ripe fruit. (Firm, less- ripe fruit contains more fructose.)   Vegetables -Artichokes  -Asparagus  -Sugar snap peas  -Cabbage  -Onions  -Shallot  -Jorge  -Onion and garlic salt  powders  -Garlic  -Cauliflower  -Mushrooms  -Pumpkin  -Green Peppers -Bok prince, Bean sprouts  -Red bell pepper  -Lettuce, Spinach  -Carrots  -Chives, Spring onion (green part    Only)  -Cucumber  -Eggplant  -Green beans  -Tomato  -Potatoes  -Garlic infused oil; purchase    flavored oil or saute onion and    garlic in oil and then discard    onion and garlic  -Water chestnuts  -<1 stick celery  -<1/2 cup sweet potato, broccoli,    [place] sprouts, butternut    squash, fennel  -<1/3 cup green peas  -<10 snow peas         Grains -Wheat  -Rye  -Barley-large quantities  -Spelt -Brown rice  -Oats, oat bran  -Quinoa  -Corn  -Gluten-free bread, cereals,    pastas and crackers without    honey, apple/pear juice, agave    or HFCS  -[place]amaste Food Perfect Flour  Blend or Lb Santi Gluten Free  -Multi-Purpose Flour   Legumes -[place]as, Hummus  -Kidney beans, Baked beans  -Edamame  -Lentils  -Soy milk -Tofu  -Peanuts   Nuts and Seeds -Pistachios -10-15 max or 1-2 tablespoons of Almonds, Macadamia, Pecans,  Pine nuts, Walnuts, Pumpkin  Seed, Sesame seeds,  and  Sunflower seeds   Sweeteners -Honey  -Agave  -High fructose corn syrup  -Sorbitol, Mannitol, Xylitol,  Maltitol  -Splenda (may alter friendly  gut sarah) -Sugar  -Glucose, Sucrose  -Pure maple syrup  -Aspartame   Additives -Inulin, found in yogurt, cj, cereals, and other foods with added fiber  -FOS    (fructo- oligosaccharides)  -Sugar alcohols (see sweeteners)  -Chicory root    Alcohol -Rum -Wine, Beer  -Vodka, Gin  -Limit to one serving as all  alcohol is a gastric irritant   Protein-rich food  -Fish, Chicken, Turkey, Eggs,  Meat   Fat-rich food  -Olive and canola oil  -Olives  -<1/4 avocado      Sample Low FODMAPs Diet Menu:     Breakfast  [place] Corn Flakes or oats, with rice or almond milk, banana and 1 tablespoon sliced almonds  H[employer] or [employer] oatmeal with 1 tablespoon dried fruit and nuts  Quinoa flakes with rice or almond milk, 3/4 cup strawberries, and 1 tablespoon pecans     Lunch  Moustapha's white bread sandwich with sliced turkey, lettuce or spinach leaves, tomato, sliced cheddar cheese and [place] lactose-free vanilla yogurt, 1/2 cup blueberries and baby carrots  Stir grajeda with brown rice or rice noodles, chicken, shrimp, or beef, peppers and bok prince, ask for no onion or garlic and the sauce on the side  Fruit salad with 1 cup (total) low FODMAP fruits, kiwi, strawberries and blueberries, spinach salad with lemon dressing and cherry tomatoes, and brown rice cakes with natural almond butter     Snack  Glutino pretzels or [place] Nut thins and mozzarella string cheese  Hard boiled egg and cherry tomatoes  Pumpkin seeds  Brown rice cakes with natural peanut butter  Banana and handful almonds  1 stick celery with natural almond butter or,  Carrots and red pepper dipped in tahini     Dinner  Grilled chicken or salmon with baked sweet potato with olive oil or butter, sauteed spinach and red peppers seasoned with green parts of onion, salt, pepper, handful of pine nuts and olive  oil, and a kiwi  Annabelle's baked potato and a side salad with chicken, bring your own homemade salad dressing that does not contain garlic or onion  Sushi

## 2024-10-16 NOTE — PROGRESS NOTES
[facility] [place] - Outpatient Consultation  Love Álvarez 54 y.o. female MRN: [id number]  Encounter: [id number]          ASSESSMENT AND PLAN:      1. Abdominal cramping  2. Bloating  3. Diarrhea, unspecified type  Patient reports abdominal bloating, gas, cramping, and diarrhea since September 12th.  Bowel movements occur mostly in the mornings and she has upwards of 4 bowel movements a day, denies any nocturnal symptoms, unintended weight loss, rectal bleeding.  She denies any recent antibiotics, sick contacts, travel, changes to her medications.  She reports she typically has issues with constipation use of stool softeners and was previously diagnosed with IBS-C.  She has upcoming colonoscopy scheduled 12/13.  Differentials are broad and include IBS-D, celiac disease, H. pylori infection, SIBO, diet intolerance etc. will obtain labs, stool studies as listed below for further evaluation.  If celiac panel comes back positive for inconclusive then may need to add EGD to upcoming colonoscopy.  She will trial elimination of dairy as well as gluten after celiac testing is performed.  If this is not effective, can trial low FODMAP diet.  Will start on Bentyl 10 mg twice daily, side effect profile discussed.  She can take up to 2 capsules 4 times a day if needed.  Can continue Gas-X as needed.  Contingencies may include SIBO breath testing versus empiric treatment with Xifaxan.  -     Celiac Disease Panel; Future  -     Stool Enteric Bacterial Panel by PCR; Future  -     Clostridium difficile toxin by PCR with EIA; Future  -     Ova and parasite examination; Future  -     -H. pylori antigen, stool  -    CBC and Platelet  -     Comprehensive metabolic panel  -     TSH, 3rd generation with Free T4 reflex  -     dicyclomine (BENTYL) 10 mg capsule; Take 1 capsule (10 mg total) by mouth 2 (two) times a day      ______________________________________________________________________    HPI:  Love Álvarez is  a 54 y.o. female who presents for evaluation of stomach cramping and diarrhea. She reports symptoms started on September 12th after eating some whole grain bread. She's having abdominal cramping, bloating, diarrhea 4x daily. Denies any rectal bleeding, fevers/chills. Denies any travel, antibiotics, sick contacts, med changes, diet changes prior to onset of symptoms. She typically has issues with constipation having to take stool softeners and was diagnosed with IBS-C in the past. Denies any family hx of colon cancer, IBD, Celiac disease.       REVIEW OF SYSTEMS:    CONSTITUTIONAL: Denies any fever, chills, rigors, and weight loss.  HEENT: No earache or tinnitus.  CARDIOVASCULAR: No chest pain or palpitations.   RESPIRATORY: Denies any cough, hemoptysis, shortness of breath or dyspnea on exertion.  GASTROINTESTINAL: As noted in the History of Present Illness.   GENITOURINARY: Denies any hematuria or dysuria.  NEUROLOGIC: No dizziness or vertigo.   MUSCULOSKELETAL: Denies any joint swellings.  SKIN: Denies skin rashes or itching.   ENDOCRINE: Denies excessive thirst. Denies intolerance to heat or cold.  PSYCHOSOCIAL: Denies depression or anxiety. Denies any recent memory loss.       Historical Information   Past Medical History:   Diagnosis Date    Atypical squamous cells cannot exclude high grade squamous intraepithelial lesion on cytologic smear of cervix (ASC-H)     6/1/2009 PAP ASC-H / HPV + . 10/2/2008 PAP GUANAKITO- CANNOT RULE OUT A NEOPLASTIC PROCESS / HPV +    Basal cell carcinoma     BETTY III (cervical intraepithelial neoplasia grade III) with severe dysplasia     resolved: 7/1/2008. LEEP- 7/1/2009 BETTY II-III    Fatigue     HPV (human papilloma virus) infection     6/1/2009 PAP ASC-H /HPV +. PAP 11/10/2008 BETTY I /LGSIL / HPV. 10/2/2008 PAP GUANAKITO- CANNOT RULE OUT A NEOPLASTIC PROCESS/ HPV +. 11/10/2008 COLPO BETTY I / LGSIL / HPV POSITIVE      Uterine leiomyoma      Past Surgical History:   Procedure Laterality Date  "   BUNIONECTOMY      CERVICAL BIOPSY  W/ LOOP ELECTRODE EXCISION      leep - leticia III. Resolved: 7/2009    COLPOSCOPY  12/03/2003    pap smear abnormality of cervix with ASCUS favoring benign.     COLPOSCOPY VULVA W/ BIOPSY  11/10/2008    colpo leticia I/ LGSIL/ HPV positive    TONSILLECTOMY AND ADENOIDECTOMY       Social History   Social History     Substance and Sexual Activity   Alcohol Use Yes    Alcohol/week: 7.0 standard drinks of alcohol    Types: 1 Glasses of wine, 6 Cans of beer per week    Comment: social     Social History     Substance and Sexual Activity   Drug Use No     Social History     Tobacco Use   Smoking Status Never   Smokeless Tobacco Never   Tobacco Comments    Never smoked     Family History   Problem Relation Age of Onset    No Known Problems Mother     No Known Problems Father     No Known Problems Maternal Grandmother     No Known Problems Maternal Grandfather     No Known Problems Paternal Grandmother     No Known Problems Paternal Grandfather     No Known Problems Maternal Aunt     No Known Problems Maternal Aunt     Arthritis Family     Diabetes Family     Other Family         seizure, urinary incontinence    Varicose Veins Family     Colon cancer Neg Hx     Ovarian cancer Neg Hx     Uterine cancer Neg Hx     Cervical cancer Neg Hx        Meds/Allergies       Current Outpatient Medications:     ALPRAZolam (XANAX) 0.25 mg tablet    cyclobenzaprine (FLEXERIL) 10 mg tablet    dicyclomine (BENTYL) 10 mg capsule    levothyroxine 50 mcg tablet    Allergies   Allergen Reactions    Sulfa Antibiotics            Objective     Blood pressure 139/96, pulse 84, height 5' 7\" (1.702 m), weight 56.4 kg (124 lb 6.4 oz), last menstrual period 03/20/2023, not currently breastfeeding. Body mass index is 19.48 kg/m².        PHYSICAL EXAM:      General Appearance:   Alert, cooperative, no distress   HEENT:   Normocephalic, atraumatic, anicteric.     Neck:  Supple, symmetrical, trachea midline   Lungs:   Clear to " auscultation bilaterally; no rales, rhonchi or wheezing; respirations unlabored    Heart::   Regular rate and rhythm; no murmur.   Abdomen:   Soft, non-tender, non-distended; normal bowel sounds; no masses, no organomegaly    Genitalia:   Deferred    Rectal:   Deferred    Extremities:  No cyanosis, clubbing or edema    Skin:  No jaundice, rashes, or lesions    Lymph nodes:  No palpable cervical lymphadenopathy        Lab Results:   No visits with results within 1 Day(s) from this visit.   Latest known visit with results is:   Annual Exam on 09/26/2023   Component Date Value    Case Report 09/26/2023                      Value:Gynecologic Cytology Report                       Case: [id number]                                  Authorizing Provider:  YOSHI Block       Collected:           09/26/2023 1120              Ordering Location:     [facility]      Received:            09/26/2023 1120                                     [facility]                                                                  First Screen:          Natali Siddiqui                                                                  Specimen:    LIQUID-BASED PAP, SCREENING, Cervix                                                        Primary Interpretation 09/26/2023 Negative for intraepithelial lesion or malignancy     Specimen Adequacy 09/26/2023 Satisfactory for evaluation. Endocervical/transformation zone component present.     Additional Information 09/26/2023                      Value:[employer]'s FDA approved ,  and ThinPrep Imaging Duo System are                           utilized with strict adherence to the 's instruction manual to                           prepare gynecologic and non-gynecologic cytology specimens for the                           production of ThinPrep slides as well as for gynecologic  ThinPrep imaging.                           These processes have been validated by our laboratory and/or by the                           .                          The Pap test is not a diagnostic procedure and should not be used as the                           sole means to detect cervical cancer. It is only a screening procedure to                           aid in the detection of cervical cancer and its precursors. Both                           false-negative and false-positive results have been experienced. Your                           patient's test result should be interpreted in this context together with                           the history and clinical findings.    HPV Other HR 09/26/2023 Negative     HPV16 09/26/2023 Negative     HPV18 09/26/2023 Negative          Radiology Results:   Mammo screening bilateral w 3d & cad    Result Date: 10/9/2024  Narrative: DIAGNOSIS: Screening mammogram for breast cancer TECHNIQUE: Digital screening mammography was performed. Computer Aided Detection (CAD) analyzed all applicable images. COMPARISONS: Multiple prior exams dated between 12/17/2009 and 3/3/2023. RELEVANT HISTORY: Family Breast Cancer History: No known family history of breast cancer. Family Medical History: No known relevant family medical history. Personal History: Hormone history includes birth control. No known relevant surgical history. No known relevant medical history. The patient is scheduled in a reminder system for screening mammography. 8-10% of cancers will be missed on mammography. Management of a palpable abnormality must be based on clinical grounds.  Patients will be notified of their results via letter from our facility. Accredited by American College of Radiology and FDA. RISK ASSESSMENT: 5 Year Tyrer-Cuzick: 1.3% 10 Year Tyrer-Cuzick: 2.87% Lifetime Tyrer-Cuzick: 8.9% TISSUE DENSITY: The breasts are extremely dense, which lowers the sensitivity of mammography. INDICATION:  Love Álvarez is a 54 y.o. female presenting for screening mammography. FINDINGS: Bilateral There are no suspicious masses, grouped microcalcifications or areas of unexplained architectural distortion. The skin and nipple areolar complex are unremarkable.  A few diffusely distributed calcifications are noted in each breast.     Impression: No mammographic evidence of malignancy. ASSESSMENT/BI-RADS CATEGORY: Left: 2 - Benign Right: 2 - Benign Overall: 2 - Benign RECOMMENDATION:      - Routine screening mammogram in 1 year for both breasts. Workstation ID: [id number] Signed by:  Tc Green MD

## 2024-11-07 DIAGNOSIS — R10.9 ABDOMINAL CRAMPING: ICD-10-CM

## 2024-11-07 DIAGNOSIS — R19.7 DIARRHEA, UNSPECIFIED TYPE: ICD-10-CM

## 2024-11-07 DIAGNOSIS — R14.0 BLOATING: ICD-10-CM

## 2024-11-07 RX ORDER — DICYCLOMINE HYDROCHLORIDE 10 MG/1
10 CAPSULE ORAL 2 TIMES DAILY
Qty: 180 CAPSULE | Refills: 1 | Status: SHIPPED | OUTPATIENT
Start: 2024-11-07

## 2024-12-06 ENCOUNTER — TELEPHONE (OUTPATIENT)
Age: 54
End: 2024-12-06

## 2024-12-10 NOTE — TELEPHONE ENCOUNTER
Pt called requesting for prep instruction emailed to [email or web address]    Emailed on 12-6-2024  
Pt called to make sure we know to use the correct preventive screening codes for her procedure - said if it needs to be coded as a diagnostic screening please reach out to her and advise beforehand  due to Insurance payout stipulations.   
99

## 2024-12-13 ENCOUNTER — ANESTHESIA (OUTPATIENT)
Dept: GASTROENTEROLOGY | Facility: HOSPITAL | Age: 54
End: 2024-12-13
Payer: COMMERCIAL

## 2024-12-13 ENCOUNTER — HOSPITAL ENCOUNTER (OUTPATIENT)
Dept: GASTROENTEROLOGY | Facility: HOSPITAL | Age: 54
Setting detail: OUTPATIENT SURGERY
End: 2024-12-13
Attending: INTERNAL MEDICINE
Payer: COMMERCIAL

## 2024-12-13 ENCOUNTER — ANESTHESIA EVENT (OUTPATIENT)
Dept: GASTROENTEROLOGY | Facility: HOSPITAL | Age: 54
End: 2024-12-13
Payer: COMMERCIAL

## 2024-12-13 VITALS
HEIGHT: 67 IN | DIASTOLIC BLOOD PRESSURE: 82 MMHG | BODY MASS INDEX: 19.67 KG/M2 | WEIGHT: 125.3 LBS | TEMPERATURE: 97.8 F | SYSTOLIC BLOOD PRESSURE: 119 MMHG | RESPIRATION RATE: 16 BRPM | OXYGEN SATURATION: 99 % | HEART RATE: 85 BPM

## 2024-12-13 DIAGNOSIS — Z86.0100 HISTORY OF COLON POLYPS: ICD-10-CM

## 2024-12-13 PROBLEM — F41.9 ANXIETY: Status: ACTIVE | Noted: 2024-12-13

## 2024-12-13 PROBLEM — E03.9 HYPOTHYROIDISM: Status: ACTIVE | Noted: 2024-12-13

## 2024-12-13 PROCEDURE — G0121 COLON CA SCRN NOT HI RSK IND: HCPCS | Performed by: INTERNAL MEDICINE

## 2024-12-13 RX ORDER — PROPOFOL 10 MG/ML
INJECTION, EMULSION INTRAVENOUS AS NEEDED
Status: DISCONTINUED | OUTPATIENT
Start: 2024-12-13 | End: 2024-12-13

## 2024-12-13 RX ORDER — LIDOCAINE HYDROCHLORIDE 20 MG/ML
INJECTION, SOLUTION EPIDURAL; INFILTRATION; INTRACAUDAL; PERINEURAL AS NEEDED
Status: DISCONTINUED | OUTPATIENT
Start: 2024-12-13 | End: 2024-12-13

## 2024-12-13 RX ORDER — SODIUM CHLORIDE, SODIUM LACTATE, POTASSIUM CHLORIDE, CALCIUM CHLORIDE 600; 310; 30; 20 MG/100ML; MG/100ML; MG/100ML; MG/100ML
INJECTION, SOLUTION INTRAVENOUS CONTINUOUS PRN
Status: DISCONTINUED | OUTPATIENT
Start: 2024-12-13 | End: 2024-12-13

## 2024-12-13 RX ADMIN — PROPOFOL 100 MG: 10 INJECTION, EMULSION INTRAVENOUS at 15:20

## 2024-12-13 RX ADMIN — PROPOFOL 50 MG: 10 INJECTION, EMULSION INTRAVENOUS at 15:26

## 2024-12-13 RX ADMIN — PROPOFOL 20 MG: 10 INJECTION, EMULSION INTRAVENOUS at 15:21

## 2024-12-13 RX ADMIN — SODIUM CHLORIDE, SODIUM LACTATE, POTASSIUM CHLORIDE, AND CALCIUM CHLORIDE: .6; .31; .03; .02 INJECTION, SOLUTION INTRAVENOUS at 15:16

## 2024-12-13 RX ADMIN — PROPOFOL 50 MG: 10 INJECTION, EMULSION INTRAVENOUS at 15:23

## 2024-12-13 RX ADMIN — PROPOFOL 50 MG: 10 INJECTION, EMULSION INTRAVENOUS at 15:29

## 2024-12-13 RX ADMIN — LIDOCAINE HYDROCHLORIDE 80 MG: 20 INJECTION, SOLUTION EPIDURAL; INFILTRATION; INTRACAUDAL; PERINEURAL at 15:19

## 2024-12-13 NOTE — H&P
History and Physical - [facility]  Love Álvarez 54 y.o. female MRN: [id number]                  HPI: Love Álvarez is a 54 y.o. year old female who presents for history of polyp 5 years ago      REVIEW OF SYSTEMS: Per the HPI, and otherwise unremarkable.    Historical Information   Past Medical History:   Diagnosis Date    Atypical squamous cells cannot exclude high grade squamous intraepithelial lesion on cytologic smear of cervix (ASC-H)     6/1/2009 PAP ASC-H / HPV + . 10/2/2008 PAP GUANAKITO- CANNOT RULE OUT A NEOPLASTIC PROCESS / HPV +    Basal cell carcinoma     LETICIA III (cervical intraepithelial neoplasia grade III) with severe dysplasia     resolved: 7/1/2008. LEEP- 7/1/2009 LETICIA II-III    Fatigue     HPV (human papilloma virus) infection     6/1/2009 PAP ASC-H /HPV +. PAP 11/10/2008 LETICIA I /LGSIL / HPV. 10/2/2008 PAP GUANAKITO- CANNOT RULE OUT A NEOPLASTIC PROCESS/ HPV +. 11/10/2008 COLPO LETICIA I / LGSIL / HPV POSITIVE      Uterine leiomyoma      Past Surgical History:   Procedure Laterality Date    BUNIONECTOMY      CERVICAL BIOPSY  W/ LOOP ELECTRODE EXCISION      leep - leticia III. Resolved: 7/2009    COLPOSCOPY  12/03/2003    pap smear abnormality of cervix with ASCUS favoring benign.     COLPOSCOPY VULVA W/ BIOPSY  11/10/2008    colpo lteicia I/ LGSIL/ HPV positive    TONSILLECTOMY AND ADENOIDECTOMY       Social History   Social History     Substance and Sexual Activity   Alcohol Use Yes    Alcohol/week: 7.0 standard drinks of alcohol    Types: 1 Glasses of wine, 6 Cans of beer per week    Comment: social     Social History     Substance and Sexual Activity   Drug Use No     Social History     Tobacco Use   Smoking Status Never   Smokeless Tobacco Never   Tobacco Comments    Never smoked     Family History   Problem Relation Age of Onset    No Known Problems Mother     No Known Problems Father     No Known Problems Maternal Grandmother     No Known Problems Maternal Grandfather     No Known Problems  Paternal Grandmother     No Known Problems Paternal Grandfather     No Known Problems Maternal Aunt     No Known Problems Maternal Aunt     Arthritis Family     Diabetes Family     Other Family         seizure, urinary incontinence    Varicose Veins Family     Colon cancer Neg Hx     Ovarian cancer Neg Hx     Uterine cancer Neg Hx     Cervical cancer Neg Hx        Meds/Allergies       Current Outpatient Medications:     ALPRAZolam (XANAX) 0.25 mg tablet    dicyclomine (BENTYL) 10 mg capsule    levothyroxine 50 mcg tablet    cyclobenzaprine (FLEXERIL) 10 mg tablet    Allergies   Allergen Reactions    Sulfa Antibiotics Throat Swelling and Tongue Swelling       Objective     LMP 03/20/2023 (Exact Date)       PHYSICAL EXAM    Gen: NAD  Head: NCAT  CV: RRR  CHEST: Clear  ABD: soft, NT/ND  EXT: no edema      ASSESSMENT/PLAN:  This is a 54 y.o. year old female here for colonoscopy, and she is stable and optimized for her procedure.

## 2024-12-13 NOTE — ANESTHESIA PREPROCEDURE EVALUATION
Procedure:  COLONOSCOPY    Relevant Problems   ANESTHESIA (within normal limits)      CARDIO   (-) Angina at rest (HCC)   (-) Angina of effort (HCC)   (-) FRANCIS (dyspnea on exertion)      ENDO   (+) Hypothyroidism      GI/HEPATIC (within normal limits)   (-) Gastroesophageal reflux disease      /RENAL (within normal limits)      HEMATOLOGY (within normal limits)      MUSCULOSKELETAL (within normal limits)      NEURO/PSYCH   (+) Anxiety      PULMONARY (within normal limits)   (-) Smoking   (-) URI (upper respiratory infection)        Physical Exam    Airway    Mallampati score: III  TM Distance: >3 FB  Neck ROM: full     Dental   No notable dental hx     Cardiovascular  Rhythm: regular, Rate: normal    Pulmonary   Breath sounds clear to auscultation    Other Findings        Anesthesia Plan  ASA Score- 2     Anesthesia Type- IV sedation with anesthesia with ASA Monitors.         Additional Monitors:     Airway Plan:            Plan Factors-Exercise tolerance (METS): >4 METS.    Chart reviewed.        Patient is not a current smoker.              Induction- intravenous.    Postoperative Plan-     Perioperative Resuscitation Plan - Level 1 - Full Code.       Informed Consent- Anesthetic plan and risks discussed with patient.  I personally reviewed this patient with the CRNA. Discussed and agreed on the Anesthesia Plan with the CRNA..

## 2025-01-14 ENCOUNTER — ANNUAL EXAM (OUTPATIENT)
Age: 55
End: 2025-01-14
Payer: COMMERCIAL

## 2025-01-14 VITALS
SYSTOLIC BLOOD PRESSURE: 116 MMHG | HEIGHT: 67 IN | DIASTOLIC BLOOD PRESSURE: 82 MMHG | BODY MASS INDEX: 20.72 KG/M2 | WEIGHT: 132 LBS

## 2025-01-14 DIAGNOSIS — D21.9 FIBROIDS: ICD-10-CM

## 2025-01-14 DIAGNOSIS — Z12.31 SCREENING MAMMOGRAM FOR BREAST CANCER: ICD-10-CM

## 2025-01-14 DIAGNOSIS — R92.343 EXTREMELY DENSE TISSUE OF BOTH BREASTS ON MAMMOGRAPHY: ICD-10-CM

## 2025-01-14 DIAGNOSIS — Z01.419 ENCOUNTER FOR GYNECOLOGICAL EXAMINATION WITHOUT ABNORMAL FINDING: Primary | ICD-10-CM

## 2025-01-14 DIAGNOSIS — N95.1 MENOPAUSAL SYMPTOMS: ICD-10-CM

## 2025-01-14 DIAGNOSIS — N89.8 VAGINAL ITCHING: ICD-10-CM

## 2025-01-14 PROCEDURE — S0612 ANNUAL GYNECOLOGICAL EXAMINA: HCPCS | Performed by: NURSE PRACTITIONER

## 2025-01-14 RX ORDER — BETAMETHASONE DIPROPIONATE 0.5 MG/G
CREAM TOPICAL
COMMUNITY
Start: 2024-12-30

## 2025-01-14 RX ORDER — CLOTRIMAZOLE AND BETAMETHASONE DIPROPIONATE 10; .64 MG/G; MG/G
CREAM TOPICAL 2 TIMES DAILY PRN
Qty: 45 G | Refills: 1 | Status: SHIPPED | OUTPATIENT
Start: 2025-01-14 | End: 2025-01-28

## 2025-01-14 NOTE — PROGRESS NOTES
Diagnoses and all orders for this visit:    Encounter for gynecological examination without abnormal finding    Menopausal symptoms    Fibroids  -     US pelvis complete w transvaginal; Future    Vaginal itching  -     clotrimazole-betamethasone (LOTRISONE) 1-0.05 % cream; Apply topically 2 (two) times a day as needed (for itching) for up to 14 days    Extremely dense tissue of both breasts on mammography  -     US breast screening bilateral complete (ABUS); Future    Screening mammogram for breast cancer  -     Mammo screening bilateral w 3d and cad; Future    Calcium/vit d inclusion in the diet discussed, call with any issues, SBE reinforced, all concerns addressed.            Pleasant 55 y.o. postmenopausal female here for annual exam. She denies any issues with bleeding. Still having hot flashes and insomnia, will try herbs for now. Menarche 14. She has a history of abnormal pap smears and LEEP Hx in 2009. Last Paps 2018 and 09/26/2023 neg and HPV neg, a pap was NOT done today but is recommended at 3 year intervals per ASCCP. Denies vaginal issues. She has occasional LLQ pelvic pain. She denies dyspareunia. Sexually active without any concerns-not too active though,  has a 4.5cm AAA. Colonoscopy due again in 10 yrs, last done 12/13/2024. Mammogram done 10/07/2024 nml BUT extremely dense breasts. ABUS ordered for dense breasts, pt instructed to check on insurance coverage for this test. Advised to do 6 months after her mammogram for continued surveillance. Patient agrees with this plan.    Past Medical History:   Diagnosis Date    Atypical squamous cells cannot exclude high grade squamous intraepithelial lesion on cytologic smear of cervix (ASC-H)     6/1/2009 PAP ASC-H / HPV + . 10/2/2008 PAP GUANAKITO- CANNOT RULE OUT A NEOPLASTIC PROCESS / HPV +    Basal cell carcinoma     BETTY III (cervical intraepithelial neoplasia grade III) with severe dysplasia     resolved: 7/1/2008. LEEP- 7/1/2009 BETTY II-III     Fatigue     HPV (human papilloma virus) infection     6/1/2009 PAP ASC-H /HPV +. PAP 11/10/2008 LETICIA I /LGSIL / HPV. 10/2/2008 PAP GUANAKITO- CANNOT RULE OUT A NEOPLASTIC PROCESS/ HPV +. 11/10/2008 COLPO LETICIA I / LGSIL / HPV POSITIVE      Uterine leiomyoma      Past Surgical History:   Procedure Laterality Date    BUNIONECTOMY      CERVICAL BIOPSY  W/ LOOP ELECTRODE EXCISION      leep - leticia III. Resolved: 7/2009    COLPOSCOPY  12/03/2003    pap smear abnormality of cervix with ASCUS favoring benign.     COLPOSCOPY VULVA W/ BIOPSY  11/10/2008    colpo leticia I/ LGSIL/ HPV positive    TONSILLECTOMY AND ADENOIDECTOMY       Family History   Problem Relation Age of Onset    No Known Problems Mother     No Known Problems Father     No Known Problems Maternal Grandmother     No Known Problems Maternal Grandfather     No Known Problems Paternal Grandmother     No Known Problems Paternal Grandfather     No Known Problems Maternal Aunt     No Known Problems Maternal Aunt     Arthritis Family     Diabetes Family     Other Family         seizure, urinary incontinence    Varicose Veins Family     Colon cancer Neg Hx     Ovarian cancer Neg Hx     Uterine cancer Neg Hx     Cervical cancer Neg Hx      Social History     Tobacco Use    Smoking status: Never    Smokeless tobacco: Never    Tobacco comments:     Never smoked   Vaping Use    Vaping status: Never Used   Substance Use Topics    Alcohol use: Yes     Alcohol/week: 7.0 standard drinks of alcohol     Types: 1 Glasses of wine, 6 Cans of beer per week     Comment: social    Drug use: No       Current Outpatient Medications:     ALPRAZolam (XANAX) 0.25 mg tablet, Take 0.25 mg by mouth Three times a day, Disp: , Rfl:     betamethasone dipropionate (DIPROSONE) 0.05 % cream, APPLY TWICE A DAY TO TORSO AND BACK, Disp: , Rfl:     clotrimazole-betamethasone (LOTRISONE) 1-0.05 % cream, Apply topically 2 (two) times a day as needed (for itching) for up to 14 days, Disp: 45 g, Rfl: 1     "cyclobenzaprine (FLEXERIL) 10 mg tablet, TAKE 1 TABLET EVERY DAY, Disp: , Rfl:     dicyclomine (BENTYL) 10 mg capsule, TAKE 1 CAPSULE BY MOUTH 2 TIMES A DAY., Disp: 180 capsule, Rfl: 1    levothyroxine 50 mcg tablet, , Disp: , Rfl:   Patient Active Problem List    Diagnosis Date Noted    Hypothyroidism 2024    Anxiety 2024    Fibroids 2022       Allergies   Allergen Reactions    Sulfa Antibiotics Throat Swelling and Tongue Swelling       OB History    Para Term  AB Living   1 1 1   1   SAB IAB Ectopic Multiple Live Births       1      # Outcome Date GA Lbr Amado/2nd Weight Sex Type Anes PTL Lv   1 Term               Obstetric Comments         [employer] in [place]  Son is 34 yrs old, no grands    Vitals:    25 0732   BP: 116/82   BP Location: Left arm   Patient Position: Standing   Cuff Size: Standard   Weight: 59.9 kg (132 lb)   Height: 5' 7\" (1.702 m)       Body mass index is 20.67 kg/m².    Review of Systems   Constitutional: Negative for chills, fatigue, fever and unexpected weight change.   Respiratory: Negative for shortness of breath.    Gastrointestinal: Negative for anal bleeding or blood in stool, +IBS constipation and diarrhea.   Genitourinary: Negative for difficulty urinating, dysuria and hematuria.     Physical Exam   Constitutional: She appears well-developed and well-nourished. No distress.   HENT:  Asymptomatic, EOMI  Head: Normocephalic.   Neck: Normal range of motion. Neck supple.   Pulmonary: Effort normal.  Breasts: bilateral without masses, skin changes or nipple discharge. Bilaterally soft and warm to touch. No areas of erythema or pain.  Abdominal: Soft.   Pelvic exam was performed with patient supine. No labial fusion. There is no rash, tenderness, lesion or injury on the right labia. There is no rash, tenderness, lesion or injury on the left labia. Urethral meatus does not show any tenderness, inflammation or discharge. Palpation of midline " bladder without pain or discomfort.Uterus is not deviated, + fibroids on u/s in 2021, not fixed and not tender. Cervix exhibits no motion tenderness, no discharge and no friability. LEEP cervix. Right adnexum displays no mass, no tenderness and no fullness. Left adnexum displays no mass, no tenderness and no fullness. No erythema or tenderness in the vagina. No foreign body in the vagina. No signs of injury around the vagina. No vaginal discharge found. No signs of injury around the vagina or anus. Perineum without lesions, signs of injury, erythema or swelling.  Lymphadenopathy:        Right: No inguinal adenopathy present.        Left: No inguinal adenopathy present.

## 2025-01-14 NOTE — PATIENT INSTRUCTIONS
Patient Education     Lowering Your Risk of Breast Cancer   About this topic   Breast cancer is a serious illness. Breast cancer is when abnormal cells grow and divide more quickly in your breast. These cells form a growth or tumor. The abnormal cells may enter nearby tissue and spread to other parts of the body. It is the type of cancer most often seen in women. Men can have breast cancer, but it is a rare condition.  General   Some things in your life may increase your risk of breast cancer. You may not be able to change some of these. Others you can control.  You are more likely to get breast cancer if you:  Have a mother, sister, or daughter who has had breast cancer  Have used hormones for menopause for more than 5 years  Have had radiation therapy to the breast or chest in the past  Are overweight or do not exercise  Had your first menstrual period before you were 9 years old  Went through menopause after age 53  Have never been pregnant or had your first child after age 33  Have had breast cancer before  Drink alcohol in any form  Have dense breasts  Are older in age  There is no certain way to prevent breast cancer. There are things you can do to lower your chances of having breast cancer.  Keep a healthy weight. Lose weight if you are overweight. Being overweight raises your chances of having breast cancer.  Eat a healthy diet to maintain a healthy weight, such as more fruits, vegetables, and lean cuts of meat. Decrease the amount of saturated fat in your diet.  Exercise. Being active helps you keep a healthy weight.  Limit your alcohol intake or do not drink alcohol. The more alcohol you drink, the higher your risk.  Do not smoke cigarettes. Smoking can increase your risk of many types of cancer.  Breastfeed your baby. This may help protect you. The longer you breastfeed, the more protection you have.  Talk with your doctor about:  Limiting or stopping hormone therapy.  Taking certain drugs to prevent  breast cancer. For women at high risk of having breast cancer, there are a few drugs that may lower your risk.  Surgery to prevent you from having breast cancer if you are very high risk.  When do I need to call the doctor?   Changes in your breasts  A lump or area in your breast that feels different  Discharge from your nipple  Skin on your breast is dimpled or indented  You have questions or concerns about your breasts  Helpful tips   Talk to your doctor about the best kind of breast cancer screening for you.  If you want to do self breast exams, have your doctor show you the right way to do them.  Tell your doctor of any abnormal finding.  Last Reviewed Date   2021-10-04  Consumer Information Use and Disclaimer   This generalized information is a limited summary of diagnosis, treatment, and/or medication information. It is not meant to be comprehensive and should be used as a tool to help the user understand and/or assess potential diagnostic and treatment options. It does NOT include all information about conditions, treatments, medications, side effects, or risks that may apply to a specific patient. It is not intended to be medical advice or a substitute for the medical advice, diagnosis, or treatment of a health care provider based on the health care provider's examination and assessment of a patient’s specific and unique circumstances. Patients must speak with a health care provider for complete information about their health, medical questions, and treatment options, including any risks or benefits regarding use of medications. This information does not endorse any treatments or medications as safe, effective, or approved for treating a specific patient. [employer] and its affiliates disclaim any warranty or liability relating to this information or the use thereof. The use of this information is governed by the Terms of Use, available at  https://www.woltersApplyMapuwer.com/en/know/clinical-effectiveness-terms   Copyright   Copyright © 2024 [employer] and its affiliates and/or licensors. All rights reserved.

## 2025-03-11 ENCOUNTER — TELEPHONE (OUTPATIENT)
Age: 55
End: 2025-03-11

## 2025-03-11 NOTE — TELEPHONE ENCOUNTER
Left message reminding patient to have lab work done that was ordered at last OV with Emelia BELLE. Asked her to return call with any questions.